# Patient Record
Sex: MALE | Race: WHITE | NOT HISPANIC OR LATINO | Employment: OTHER | URBAN - METROPOLITAN AREA
[De-identification: names, ages, dates, MRNs, and addresses within clinical notes are randomized per-mention and may not be internally consistent; named-entity substitution may affect disease eponyms.]

---

## 2020-11-18 ENCOUNTER — HISTORICAL (OUTPATIENT)
Dept: ADMINISTRATIVE | Facility: HOSPITAL | Age: 75
End: 2020-11-18

## 2021-01-27 ENCOUNTER — HISTORICAL (OUTPATIENT)
Dept: ADMINISTRATIVE | Facility: HOSPITAL | Age: 76
End: 2021-01-27

## 2022-01-31 ENCOUNTER — HISTORICAL (OUTPATIENT)
Dept: ADMINISTRATIVE | Facility: HOSPITAL | Age: 77
End: 2022-01-31

## 2022-01-31 LAB
BUN SERPL-MCNC: 16.9 MG/DL (ref 8.4–25.7)
CALCIUM SERPL-MCNC: 9.5 MG/DL (ref 8.7–10.5)
CHLORIDE SERPL-SCNC: 107 MMOL/L (ref 98–107)
CO2 SERPL-SCNC: 27 MMOL/L (ref 23–31)
CREAT SERPL-MCNC: 1.34 MG/DL (ref 0.73–1.18)
CREAT/UREA NIT SERPL: 13
DEPRECATED CALCIDIOL+CALCIFEROL SERPL-MC: 57.3 NG/ML (ref 30–80)
GLUCOSE SERPL-MCNC: 132 MG/DL (ref 82–115)
HEMOLYSIS INTERF INDEX SERPL-ACNC: 2
ICTERIC INTERF INDEX SERPL-ACNC: 0
LIPEMIC INTERF INDEX SERPL-ACNC: 4
POTASSIUM SERPL-SCNC: 4.5 MMOL/L (ref 3.5–5.1)
SODIUM SERPL-SCNC: 144 MMOL/L (ref 136–145)

## 2022-02-07 ENCOUNTER — HISTORICAL (OUTPATIENT)
Dept: ADMINISTRATIVE | Facility: HOSPITAL | Age: 77
End: 2022-02-07

## 2022-02-07 LAB
BUN SERPL-MCNC: 20.6 MG/DL (ref 8.4–25.7)
CALCIUM SERPL-MCNC: 10.1 MG/DL (ref 8.8–10)
CHLORIDE SERPL-SCNC: 106 MMOL/L (ref 98–107)
CO2 SERPL-SCNC: 26 MMOL/L (ref 23–31)
CREAT SERPL-MCNC: 1.42 MG/DL (ref 0.72–1.25)
CREAT/UREA NIT SERPL: 15
EST. AVERAGE GLUCOSE BLD GHB EST-MCNC: 114 MG/DL
GLUCOSE SERPL-MCNC: 108 MG/DL (ref 82–115)
HBA1C MFR BLD: 5.6 %
HEMOLYSIS INTERF INDEX SERPL-ACNC: 11
ICTERIC INTERF INDEX SERPL-ACNC: 1
POTASSIUM SERPL-SCNC: 4.8 MMOL/L (ref 3.5–5.1)
SODIUM SERPL-SCNC: 143 MMOL/L (ref 136–145)

## 2022-04-12 ENCOUNTER — HISTORICAL (OUTPATIENT)
Dept: ADMINISTRATIVE | Facility: HOSPITAL | Age: 77
End: 2022-04-12

## 2022-04-12 LAB
ABS NEUT (OLG): 5.54 (ref 2.1–9.2)
ALBUMIN SERPL-MCNC: 4.1 G/DL (ref 3.4–4.8)
ALBUMIN/GLOB SERPL: 1.2 {RATIO} (ref 1.1–2)
ALP SERPL-CCNC: 123 U/L (ref 40–150)
ALT SERPL-CCNC: 19 U/L (ref 0–55)
AST SERPL-CCNC: 18 U/L (ref 5–34)
BASOPHILS # BLD AUTO: 0.1 10*3/UL (ref 0–0.2)
BASOPHILS NFR BLD AUTO: 1 %
BILIRUB SERPL-MCNC: 0.8 MG/DL
BILIRUBIN DIRECT+TOT PNL SERPL-MCNC: 0.3 (ref 0–0.5)
BILIRUBIN DIRECT+TOT PNL SERPL-MCNC: 0.5 (ref 0–0.8)
BUN SERPL-MCNC: 15.7 MG/DL (ref 8.4–25.7)
CALCIUM SERPL-MCNC: 9.8 MG/DL (ref 8.7–10.5)
CHLORIDE SERPL-SCNC: 105 MMOL/L (ref 98–107)
CO2 SERPL-SCNC: 26 MMOL/L (ref 23–31)
CREAT SERPL-MCNC: 1.34 MG/DL (ref 0.73–1.18)
CREAT UR-MCNC: 76.3 MG/DL (ref 58–161)
EOSINOPHIL # BLD AUTO: 0.4 10*3/UL (ref 0–0.9)
EOSINOPHIL NFR BLD AUTO: 4 %
ERYTHROCYTE [DISTWIDTH] IN BLOOD BY AUTOMATED COUNT: 13.4 % (ref 11.5–17)
GLOBULIN SER-MCNC: 3.4 G/DL (ref 2.4–3.5)
GLUCOSE SERPL-MCNC: 85 MG/DL (ref 82–115)
HCT VFR BLD AUTO: 49.5 % (ref 42–52)
HEMOLYSIS INTERF INDEX SERPL-ACNC: 1
HGB BLD-MCNC: 16.3 G/DL (ref 14–18)
ICTERIC INTERF INDEX SERPL-ACNC: 1
LIPEMIC INTERF INDEX SERPL-ACNC: 1
LYMPHOCYTES # BLD AUTO: 3.3 10*3/UL (ref 0.6–4.6)
LYMPHOCYTES NFR BLD AUTO: 33 %
MANUAL DIFF? (OHS): NO
MCH RBC QN AUTO: 31 PG (ref 27–31)
MCHC RBC AUTO-ENTMCNC: 32.9 G/DL (ref 33–36)
MCV RBC AUTO: 94.1 FL (ref 80–94)
MONOCYTES # BLD AUTO: 0.6 10*3/UL (ref 0.1–1.3)
MONOCYTES NFR BLD AUTO: 6 %
NEUTROPHILS # BLD AUTO: 5.54 10*3/UL (ref 2.1–9.2)
NEUTROPHILS NFR BLD AUTO: 56 %
PHOSPHATE SERPL-MCNC: 3.3 MG/DL (ref 2.3–4.7)
PLATELET # BLD AUTO: 234 10*3/UL (ref 130–400)
PMV BLD AUTO: 10.3 FL (ref 9.4–12.4)
POTASSIUM SERPL-SCNC: 4.5 MMOL/L (ref 3.5–5.1)
PROT SERPL-MCNC: 7.5 G/DL (ref 5.8–7.6)
PROT UR STRIP-MCNC: <6.8 MG/DL
PTH-INTACT SERPL-MCNC: 81.3 PG/ML (ref 8.7–77.1)
RBC # BLD AUTO: 5.26 10*6/UL (ref 4.7–6.1)
SODIUM SERPL-SCNC: 140 MMOL/L (ref 136–145)
WBC # SPEC AUTO: 10 10*3/UL (ref 4.5–11.5)

## 2022-04-13 LAB
APPEARANCE, UA: CLEAR
BACTERIA SPEC CULT: NORMAL
BILIRUB UR QL STRIP: NEGATIVE
COLOR UR: YELLOW
GLUCOSE (UA): NEGATIVE
HGB UR QL STRIP: NEGATIVE
KETONES UR QL STRIP: NEGATIVE
LEUKOCYTE ESTERASE UR QL STRIP: NEGATIVE
NITRITE UR QL STRIP: NEGATIVE
PH UR STRIP: 5.5 [PH] (ref 5–9)
PROT UR QL STRIP: NEGATIVE
RBC #/AREA URNS HPF: NORMAL /[HPF] (ref 0–2)
SP GR UR STRIP: 1.01 (ref 1–1.03)
SQUAMOUS EPITHELIAL, UA: NORMAL (ref 0–4)
UA WBC MAN: NORMAL (ref 0–2)
UROBILINOGEN UR STRIP-ACNC: 0.2
WBC #/AREA URNS HPF: NORMAL /[HPF] (ref 0–2)

## 2022-04-21 DIAGNOSIS — N18.30 STAGE 3 CHRONIC KIDNEY DISEASE, UNSPECIFIED WHETHER STAGE 3A OR 3B CKD: Primary | ICD-10-CM

## 2022-04-21 DIAGNOSIS — I10 HYPERTENSION, UNSPECIFIED TYPE: ICD-10-CM

## 2022-04-21 DIAGNOSIS — D75.1 ERYTHROCYTOSIS: ICD-10-CM

## 2022-05-19 ENCOUNTER — OFFICE VISIT (OUTPATIENT)
Dept: HEMATOLOGY/ONCOLOGY | Facility: CLINIC | Age: 77
End: 2022-05-19
Payer: MEDICARE

## 2022-05-19 VITALS
DIASTOLIC BLOOD PRESSURE: 81 MMHG | HEIGHT: 68 IN | RESPIRATION RATE: 14 BRPM | TEMPERATURE: 98 F | SYSTOLIC BLOOD PRESSURE: 127 MMHG | WEIGHT: 164 LBS | BODY MASS INDEX: 24.86 KG/M2 | OXYGEN SATURATION: 98 % | HEART RATE: 71 BPM

## 2022-05-19 DIAGNOSIS — D45 POLYCYTHEMIA VERA: ICD-10-CM

## 2022-05-19 DIAGNOSIS — D75.1 POLYCYTHEMIA: ICD-10-CM

## 2022-05-19 DIAGNOSIS — N18.30 STAGE 3 CHRONIC KIDNEY DISEASE, UNSPECIFIED WHETHER STAGE 3A OR 3B CKD: Primary | ICD-10-CM

## 2022-05-19 LAB
BASOPHILS # BLD AUTO: 0.04 X10(3)/MCL (ref 0–0.2)
BASOPHILS NFR BLD AUTO: 0.4 %
EOSINOPHIL # BLD AUTO: 0.4 X10(3)/MCL (ref 0–0.9)
EOSINOPHIL NFR BLD AUTO: 3.6 %
ERYTHROCYTE [DISTWIDTH] IN BLOOD BY AUTOMATED COUNT: 13 % (ref 11.5–17)
HCT VFR BLD AUTO: 44.7 % (ref 42–52)
HGB BLD-MCNC: 14.7 GM/DL (ref 14–18)
IMM GRANULOCYTES # BLD AUTO: 0.03 X10(3)/MCL (ref 0–0.02)
IMM GRANULOCYTES NFR BLD AUTO: 0.3 % (ref 0–0.43)
LYMPHOCYTES # BLD AUTO: 3.61 X10(3)/MCL (ref 0.6–4.6)
LYMPHOCYTES NFR BLD AUTO: 32.6 %
MCH RBC QN AUTO: 31.2 PG (ref 27–31)
MCHC RBC AUTO-ENTMCNC: 32.9 MG/DL (ref 33–36)
MCV RBC AUTO: 94.9 FL (ref 80–94)
MONOCYTES # BLD AUTO: 0.85 X10(3)/MCL (ref 0.1–1.3)
MONOCYTES NFR BLD AUTO: 7.7 %
NEUTROPHILS # BLD AUTO: 6.1 X10(3)/MCL (ref 2.1–9.2)
NEUTROPHILS NFR BLD AUTO: 55.4 %
PLATELET # BLD AUTO: 223 X10(3)/MCL (ref 130–400)
PMV BLD AUTO: 8.9 FL (ref 9.4–12.4)
RBC # BLD AUTO: 4.71 X10(6)/MCL (ref 4.7–6.1)
WBC # SPEC AUTO: 11.1 X10(3)/MCL (ref 4.5–11.5)

## 2022-05-19 PROCEDURE — 99999 PR PBB SHADOW E&M-EST. PATIENT-LVL IV: ICD-10-PCS | Mod: PBBFAC,,, | Performed by: INTERNAL MEDICINE

## 2022-05-19 PROCEDURE — 99204 OFFICE O/P NEW MOD 45 MIN: CPT | Mod: S$PBB,,, | Performed by: INTERNAL MEDICINE

## 2022-05-19 PROCEDURE — 99204 PR OFFICE/OUTPT VISIT, NEW, LEVL IV, 45-59 MIN: ICD-10-PCS | Mod: S$PBB,,, | Performed by: INTERNAL MEDICINE

## 2022-05-19 PROCEDURE — 36415 COLL VENOUS BLD VENIPUNCTURE: CPT | Performed by: INTERNAL MEDICINE

## 2022-05-19 PROCEDURE — 82668 ASSAY OF ERYTHROPOIETIN: CPT | Performed by: INTERNAL MEDICINE

## 2022-05-19 PROCEDURE — 99214 OFFICE O/P EST MOD 30 MIN: CPT | Mod: PBBFAC | Performed by: INTERNAL MEDICINE

## 2022-05-19 PROCEDURE — 99999 PR PBB SHADOW E&M-EST. PATIENT-LVL IV: CPT | Mod: PBBFAC,,, | Performed by: INTERNAL MEDICINE

## 2022-05-19 PROCEDURE — 85025 COMPLETE CBC W/AUTO DIFF WBC: CPT | Performed by: INTERNAL MEDICINE

## 2022-05-19 RX ORDER — TAMSULOSIN HYDROCHLORIDE 0.4 MG/1
1 CAPSULE ORAL DAILY
COMMUNITY
Start: 2022-02-05

## 2022-05-19 RX ORDER — CETIRIZINE HYDROCHLORIDE 10 MG/1
10 TABLET ORAL DAILY
COMMUNITY

## 2022-05-19 RX ORDER — FAMOTIDINE 40 MG/1
40 TABLET, FILM COATED ORAL
COMMUNITY

## 2022-05-19 RX ORDER — ATORVASTATIN CALCIUM 20 MG/1
20 TABLET, FILM COATED ORAL DAILY
COMMUNITY
Start: 2022-03-21

## 2022-05-19 RX ORDER — ALBUTEROL SULFATE 90 UG/1
2 AEROSOL, METERED RESPIRATORY (INHALATION) DAILY
COMMUNITY
Start: 2022-02-04 | End: 2023-09-29 | Stop reason: SDUPTHER

## 2022-05-19 RX ORDER — TIOTROPIUM BROMIDE AND OLODATEROL 3.124; 2.736 UG/1; UG/1
2 SPRAY, METERED RESPIRATORY (INHALATION) DAILY
COMMUNITY
Start: 2022-04-04 | End: 2023-04-04 | Stop reason: SDUPTHER

## 2022-05-19 RX ORDER — VITAMIN B COMPLEX
1 CAPSULE ORAL DAILY
COMMUNITY

## 2022-05-19 RX ORDER — AMOXICILLIN 500 MG
2 CAPSULE ORAL DAILY
COMMUNITY

## 2022-05-19 RX ORDER — IBUPROFEN 100 MG/5ML
1000 SUSPENSION, ORAL (FINAL DOSE FORM) ORAL DAILY
COMMUNITY

## 2022-05-19 RX ORDER — FLUTICASONE PROPIONATE 50 MCG
1 SPRAY, SUSPENSION (ML) NASAL DAILY
COMMUNITY

## 2022-05-19 RX ORDER — ASPIRIN 81 MG/1
81 TABLET ORAL DAILY
COMMUNITY

## 2022-05-19 RX ORDER — ZINC GLUCONATE 50 MG
50 TABLET ORAL DAILY
COMMUNITY

## 2022-05-19 RX ORDER — FINASTERIDE 5 MG/1
5 TABLET, FILM COATED ORAL DAILY
COMMUNITY
Start: 2022-04-17

## 2022-05-19 NOTE — PROGRESS NOTES
Subjective:      Nephrologist: Dr. Brooklyn Weinstein     Patient ID: Edwin Raman is a 77 y.o. male.    Erythrocytosis/Polycythemia--Since 2020      Chief Complaint: Other Misc (NPH)    HPI     78 yo wm with h/o CKD Stage 3 secondary to nephrosclerosis found on routine labs by nephrologist to have mild erythrocytosis. Patient has a history of COPD on home O2 and also reports having a sleep study and O2 monitoring at night and his O2 was low at night. But he has not been diagnosed with sleep apnea and does not wear a CPAP. He is a former heavy smoker but quit several years ago.     Past Medical History:   Diagnosis Date    Arthritis     Bladder cancer     Kidney cancer, primary, with metastasis from kidney to other site 2018      Review of patient's allergies indicates:   Allergen Reactions    Ciprofloxacin Other (See Comments)      Current Outpatient Medications on File Prior to Visit   Medication Sig Dispense Refill    albuterol (PROVENTIL/VENTOLIN HFA) 90 mcg/actuation inhaler Inhale 2 puffs into the lungs once daily at 6am.      ascorbic acid, vitamin C, (VITAMIN C) 1000 MG tablet Take 1,000 mg by mouth once daily.      aspirin (ECOTRIN) 81 MG EC tablet Take 81 mg by mouth once daily.      atorvastatin (LIPITOR) 20 MG tablet Take 20 mg by mouth once daily.      b complex vitamins capsule Take 1 capsule by mouth once daily.      cetirizine (ZYRTEC) 10 MG tablet Take 10 mg by mouth once daily.      famotidine (PEPCID) 40 MG tablet Take 40 mg by mouth as needed for Heartburn.      finasteride (PROSCAR) 5 mg tablet Take 5 mg by mouth once daily.      fluticasone propionate (FLONASE) 50 mcg/actuation nasal spray 1 spray by Each Nostril route once daily.      multivitamin with minerals tablet Take 1 tablet by mouth once daily.      omega-3 fatty acids/fish oil (FISH OIL-OMEGA-3 FATTY ACIDS) 300-1,000 mg capsule Take 2 capsules by mouth once daily.      STIOLTO RESPIMAT 2.5-2.5 mcg/actuation Mist Inhale 2  puffs into the lungs once daily at 6am.      tamsulosin (FLOMAX) 0.4 mg Cap Take 1 capsule by mouth once daily.      zinc gluconate 50 mg tablet Take 50 mg by mouth once daily.       No current facility-administered medications on file prior to visit.      Review of Systems   Constitutional: Negative for appetite change, fatigue, fever and unexpected weight change.   HENT: Negative for mouth sores.    Eyes: Negative.    Respiratory: Positive for shortness of breath. Negative for cough.         COPD on home O2   Cardiovascular: Negative for chest pain and leg swelling.   Gastrointestinal: Negative for abdominal distention, abdominal pain, constipation, diarrhea, nausea, vomiting and reflux.   Genitourinary: Negative for difficulty urinating, dysuria and hematuria.   Musculoskeletal: Negative for arthralgias and back pain.   Integumentary:  Negative for rash.   Neurological: Negative for weakness and headaches.   Hematological: Negative for adenopathy.   Psychiatric/Behavioral: Negative for sleep disturbance. The patient is not nervous/anxious.               Physical Exam  Constitutional:       General: He is awake.      Appearance: Normal appearance.   HENT:      Head: Normocephalic and atraumatic.      Nose: Nose normal.      Mouth/Throat:      Mouth: Mucous membranes are moist.   Eyes:      General: Vision grossly intact.      Extraocular Movements: Extraocular movements intact.      Conjunctiva/sclera: Conjunctivae normal.   Cardiovascular:      Rate and Rhythm: Normal rate and regular rhythm.      Heart sounds: Normal heart sounds.   Pulmonary:      Effort: Pulmonary effort is normal.      Breath sounds: Normal breath sounds.   Chest:   Breasts:      Right: No supraclavicular adenopathy.      Left: No supraclavicular adenopathy.       Abdominal:      General: Bowel sounds are normal. There is no distension.      Palpations: Abdomen is soft.      Tenderness: There is no abdominal tenderness.   Musculoskeletal:       Cervical back: Normal range of motion and neck supple.      Right lower leg: No edema.      Left lower leg: No edema.   Lymphadenopathy:      Cervical: No cervical adenopathy.      Upper Body:      Right upper body: No supraclavicular adenopathy.      Left upper body: No supraclavicular adenopathy.   Skin:     General: Skin is warm.   Neurological:      Mental Status: He is alert and oriented to person, place, and time.      Motor: Motor function is intact.   Psychiatric:         Mood and Affect: Mood normal.         Speech: Speech normal.         Behavior: Behavior is cooperative.         Judgment: Judgment normal.         No visits with results within 2 Week(s) from this visit.   Latest known visit with results is:   Historical on 04/12/2022   Component Date Value    WBC 04/12/2022 10.0     RBC 04/12/2022 5.26     Hgb 04/12/2022 16.3     Hct 04/12/2022 49.5     MCV 04/12/2022 94.1     MCH 04/12/2022 31.0     MCHC 04/12/2022 32.9     RDW 04/12/2022 13.4     Platelet 04/12/2022 234     MPV 04/12/2022 10.3     Abs Neut 04/12/2022 5.54     Manual Diff? 04/12/2022 No     Neut % 04/12/2022 56     Lymph % 04/12/2022 33     Mono % 04/12/2022 6     Eos % 04/12/2022 4     Basophil % 04/12/2022 1     Lymph # 04/12/2022 3.3     Neut # 04/12/2022 5.54     Mono # 04/12/2022 0.6     Eos # 04/12/2022 0.4     Baso # 04/12/2022 0.1     Sodium Level 04/12/2022 140     Potassium Level 04/12/2022 4.5     Chloride 04/12/2022 105     Carbon Dioxide 04/12/2022 26     Glucose Level 04/12/2022 85     Blood Urea Nitrogen 04/12/2022 15.7     Creatinine 04/12/2022 1.34     Calcium Level Total 04/12/2022 9.8     Albumin Level 04/12/2022 4.1     Protein Total 04/12/2022 7.5     Globulin 04/12/2022 3.4     Albumin/Globulin Ratio 04/12/2022 1.2     Alkaline Phosphatase 04/12/2022 123     Bilirubin Total 04/12/2022 0.8     Bilirubin Direct 04/12/2022 0.3     Bilirubin Indirect 04/12/2022 0.50      Aspartate Aminotransfera* 04/12/2022 18     Alanine Aminotransferase 04/12/2022 19     Hemolysis 04/12/2022 1     Icterus 04/12/2022 1     Lipemia 04/12/2022 1     Phosphorus Level 04/12/2022 3.3     Estimated GFR- Am* 04/12/2022 >60     Estimated GFR-Non Jena* 04/12/2022 55     Parathyroid Hormone Inta* 04/12/2022 81.3     Urine Creatinine 04/12/2022 76.3     Urine Protein Level 04/12/2022 <6.8     Color, UA 04/12/2022 Yellow     Appearance, UA 04/12/2022 Clear     Specific Gravity,UA 04/12/2022 1.015     pH, UA 04/12/2022 5.5     Protein, UA 04/12/2022 Negative     Glucose, UA 04/12/2022 Negative     Ketones, UA 04/12/2022 Negative     Bilirubin (UA) 04/12/2022 Negative     Occult Blood UA 04/12/2022 Negative     Urobilinogen, UA 04/12/2022 0.2     Nitrite, UA 04/12/2022 Negative     Leukocytes, UA 04/12/2022 Negative     WBC, UA 04/12/2022 None Seen     UA WBC MAN 04/12/2022 None Seen     RBC, UA 04/12/2022 None Seen     Squam Epithel, UA 04/12/2022 0-4     Bacteria 04/12/2022 None Seen             Assessment:       1. Polycythemia    2. Polycythemia vera          Plan:       Patient with polycythemia despite having CKD which usually causes anemia.  Patient also with a h/o COPD on home O2 PRN.  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 Vera.  Will check Epo level and JAK2.  RTC 3 weeks for follow-up of results.    If findings c/w secondary polycythemia, no further w/u needed due to likely cause being COPD.  If JAK2 positive, will need treatment with phlebotomy and Hydrea due to age and risks.       All questions answered at this time.          Theresa Guardado MD

## 2022-05-23 LAB
EPO SERPL-ACNC: 18.2 MIU/ML (ref 2.6–18.5)
JAK2 P.V617F BLD/T QL: NORMAL
JAK2 P.V617F MUT/NOR BLD/T: NORMAL %

## 2022-06-09 ENCOUNTER — OFFICE VISIT (OUTPATIENT)
Dept: HEMATOLOGY/ONCOLOGY | Facility: CLINIC | Age: 77
End: 2022-06-09
Payer: MEDICARE

## 2022-06-09 VITALS
OXYGEN SATURATION: 95 % | HEART RATE: 78 BPM | RESPIRATION RATE: 14 BRPM | WEIGHT: 164.44 LBS | TEMPERATURE: 98 F | DIASTOLIC BLOOD PRESSURE: 71 MMHG | HEIGHT: 68 IN | SYSTOLIC BLOOD PRESSURE: 114 MMHG | BODY MASS INDEX: 24.92 KG/M2

## 2022-06-09 DIAGNOSIS — D75.1 POLYCYTHEMIA: Primary | ICD-10-CM

## 2022-06-09 PROCEDURE — 99999 PR PBB SHADOW E&M-EST. PATIENT-LVL IV: CPT | Mod: PBBFAC,,, | Performed by: INTERNAL MEDICINE

## 2022-06-09 PROCEDURE — 99214 PR OFFICE/OUTPT VISIT, EST, LEVL IV, 30-39 MIN: ICD-10-PCS | Mod: S$PBB,,, | Performed by: INTERNAL MEDICINE

## 2022-06-09 PROCEDURE — 99214 OFFICE O/P EST MOD 30 MIN: CPT | Mod: S$PBB,,, | Performed by: INTERNAL MEDICINE

## 2022-06-09 PROCEDURE — 99999 PR PBB SHADOW E&M-EST. PATIENT-LVL IV: ICD-10-PCS | Mod: PBBFAC,,, | Performed by: INTERNAL MEDICINE

## 2022-06-09 PROCEDURE — 99214 OFFICE O/P EST MOD 30 MIN: CPT | Mod: PBBFAC | Performed by: INTERNAL MEDICINE

## 2022-06-28 ENCOUNTER — OFFICE VISIT (OUTPATIENT)
Dept: FAMILY MEDICINE | Facility: CLINIC | Age: 77
End: 2022-06-28
Payer: MEDICARE

## 2022-06-28 VITALS
RESPIRATION RATE: 20 BRPM | BODY MASS INDEX: 24.19 KG/M2 | WEIGHT: 159.63 LBS | TEMPERATURE: 98 F | HEIGHT: 68 IN | SYSTOLIC BLOOD PRESSURE: 132 MMHG | HEART RATE: 60 BPM | DIASTOLIC BLOOD PRESSURE: 79 MMHG | OXYGEN SATURATION: 97 %

## 2022-06-28 DIAGNOSIS — N18.30 STAGE 3 CHRONIC KIDNEY DISEASE, UNSPECIFIED WHETHER STAGE 3A OR 3B CKD: ICD-10-CM

## 2022-06-28 DIAGNOSIS — J30.9 ALLERGIC RHINITIS, UNSPECIFIED SEASONALITY, UNSPECIFIED TRIGGER: ICD-10-CM

## 2022-06-28 DIAGNOSIS — R91.1 PULMONARY NODULE, RIGHT: ICD-10-CM

## 2022-06-28 DIAGNOSIS — J44.9 CHRONIC OBSTRUCTIVE PULMONARY DISEASE, UNSPECIFIED COPD TYPE: ICD-10-CM

## 2022-06-28 DIAGNOSIS — I25.10 CORONARY ARTERY DISEASE, UNSPECIFIED VESSEL OR LESION TYPE, UNSPECIFIED WHETHER ANGINA PRESENT, UNSPECIFIED WHETHER NATIVE OR TRANSPLANTED HEART: ICD-10-CM

## 2022-06-28 DIAGNOSIS — Z85.51 HISTORY OF BLADDER CANCER: ICD-10-CM

## 2022-06-28 DIAGNOSIS — Z00.00 MEDICARE ANNUAL WELLNESS VISIT, SUBSEQUENT: Primary | ICD-10-CM

## 2022-06-28 DIAGNOSIS — N40.0 BENIGN PROSTATIC HYPERPLASIA, UNSPECIFIED WHETHER LOWER URINARY TRACT SYMPTOMS PRESENT: ICD-10-CM

## 2022-06-28 DIAGNOSIS — K21.9 GASTROESOPHAGEAL REFLUX DISEASE, UNSPECIFIED WHETHER ESOPHAGITIS PRESENT: ICD-10-CM

## 2022-06-28 PROBLEM — D75.1 POLYCYTHEMIA: Status: RESOLVED | Noted: 2022-05-19 | Resolved: 2022-06-28

## 2022-06-28 PROCEDURE — G0439 PPPS, SUBSEQ VISIT: HCPCS | Mod: ,,, | Performed by: STUDENT IN AN ORGANIZED HEALTH CARE EDUCATION/TRAINING PROGRAM

## 2022-06-28 PROCEDURE — G0439 PR MEDICARE ANNUAL WELLNESS SUBSEQUENT VISIT: ICD-10-PCS | Mod: ,,, | Performed by: STUDENT IN AN ORGANIZED HEALTH CARE EDUCATION/TRAINING PROGRAM

## 2022-06-28 NOTE — ASSESSMENT & PLAN NOTE
- Patient following with Dr. Parvez Leigh with Pulmonology.  - He had COVID in 11/2020 and stopped smoking since that time.  - He states compliance with his Stiolto Respimat daily.

## 2022-06-28 NOTE — PROGRESS NOTES
"TIME UP & GO (TUG)  Test begins with patient sitting back in standard arm chair.   When "Go" is said, the patient stands up and walks 10 feet at a normal pace before turning, walking back and sitting down.    Observe the patients postural stability, gait, stride length, and sway.  Check all that apply:  ? [] Slow tentative pace  ? [] Loss of balance  ? [] Short strides  ? [] Little or no arm swing  ? [] Steadying self on walls  ? [] Shuffling  ? [] En bloc turning  ? [] Not using assistive device properly    Time in seconds:  7 Seconds  (Older adults who takes = or > 12 seconds to complete TUG is at risk for falling.      WHISPER TEST  Test begins with patient standing arms length away (2 feet), facing away from examiner.  Patient covers the ear that is NOT being tested with one finger over the tragus.  Whisper a number-letter-number combination.  If a patient gets 3 total letters and/or numbers correct after a second attempt, it is considered a pass.    Right Ear: passed    [] 8-M-3   [] K-5-R   [] 2-K-7   [] S-4-G  Left Ear: passed       [] 8-M-3   [] K-5-R   [] 2-K-7   [] S-4-G      MINI-COGNITIVE  Three Word Registration   [x]Version 1 [x]Version 2 [x]Version 3 [x]Version 4 [x]Version 5 [x]Version 6   [facility] Captain Daughter   [place] [employer]   Chair Table Baby Finger Picture Moutain           HOME SAFETY QUESTIONNAIRE  Are emergency numbers kept by the phone and regularly updated? Yes  Are all household members aware of the dangers of smoking, especially in bed? Yes  Are working smoke alarm(s) and fire extinguisher(s) available for use? Yes  Do all household members know how to use them? Yes  Are firearms stored unloaded and securely locked? N/A  Have throw rugs been removed or fastened down? N/A  Are non-slip mats in all bathtubs and showers?  Yes  Do all stairways have a railing or banister?  Yes  Are sidewalks and all outdoor steps clear of tools, toys and " other articles?  Yes  Are doorways, halls, and stairs free of clutter?  Yes  Are all electrical cords in working order, easily seen, and not run under rug/carpets or wrapped around nails? Yes

## 2022-06-28 NOTE — ASSESSMENT & PLAN NOTE
- Patient following with Dr. Brooklyn Coreas with Nephrology.   - Nephrology office visit from 04/21/22 reviewed.

## 2022-06-28 NOTE — ASSESSMENT & PLAN NOTE
- Patient following with Dr. Nas Simon with Cardiology.  - Patient taking Aspirin and Lipitor.  - Patient not able to take beta blocker due to low HR.

## 2022-06-28 NOTE — PROGRESS NOTES
"Subjective:      Patient ID: Edwin Raman is a 77 y.o. male. He is accompanied by his wife, Mrs. Dana Raman.    Chief Complaint: Medicare Wellness    Preventative Health: Patient amenable to advance care planning discussion.    COPD/Hx of Right Pulmonary Nodule: Patient following with Dr. Parvez Leigh with Pulmonology. He had COVID in 11/2020 and stopped smoking since that time. He states compliance with his Stiolto Respimat daily. He denies any shortness of breath or productive cough.     CAD s/p CABG x2: Patient following with Dr. Nas Simon with Cardiology. Patient taking Aspirin and Lipitor. Patient not able to take beta blocker due to low HR. He denies any chest pain.    BPH/Hx of Bladder Cancer: Patient following with Dr. Demarco Munoz with Urology. Patient had metastases to the kidney and treated with surgery and chemo in 2012. He is taking Finasteride and Flomax. He denies any hematuria.    GERD: Patient taking Pepcid PRN.    Polycythemia: Patient following with Dr. Theresa Guardado with Hematology-Oncology.    CKD III: Patient following with Dr. Brooklyn Coreas with Nephrology.     Review of Systems   Constitutional: Negative for activity change and fatigue.   Respiratory: Negative for apnea, cough and shortness of breath.    Cardiovascular: Negative for chest pain, palpitations and leg swelling.   Gastrointestinal: Negative for abdominal pain, nausea and vomiting.   Musculoskeletal: Negative for arthralgias.   Skin: Negative for rash and wound.   Neurological: Negative for weakness, numbness and headaches.   Psychiatric/Behavioral: Negative for behavioral problems.     Objective:   /79 (BP Location: Left arm)   Pulse 60   Temp 98.2 °F (36.8 °C)   Resp 20   Ht 5' 8" (1.727 m)   Wt 72.4 kg (159 lb 9.6 oz)   SpO2 97%   BMI 24.27 kg/m²     Physical Exam  Vitals and nursing note reviewed.   Constitutional:       General: He is not in acute distress.     Appearance: Normal appearance. He is " not ill-appearing or toxic-appearing.   HENT:      Head: Normocephalic and atraumatic.      Mouth/Throat:      Mouth: Mucous membranes are moist.      Pharynx: Oropharynx is clear.   Eyes:      Conjunctiva/sclera: Conjunctivae normal.   Cardiovascular:      Rate and Rhythm: Normal rate and regular rhythm.      Heart sounds: Normal heart sounds. No murmur heard.  Pulmonary:      Effort: Pulmonary effort is normal. No respiratory distress.      Breath sounds: Normal breath sounds. No wheezing.   Abdominal:      General: Bowel sounds are normal. There is no distension.      Palpations: Abdomen is soft.      Tenderness: There is no abdominal tenderness.   Musculoskeletal:         General: No deformity. Normal range of motion.      Cervical back: Normal range of motion and neck supple.      Right lower leg: No edema.      Left lower leg: No edema.   Skin:     General: Skin is warm and dry.      Findings: No lesion or rash.   Neurological:      General: No focal deficit present.      Mental Status: He is alert. Mental status is at baseline.   Psychiatric:         Mood and Affect: Mood normal.         Behavior: Behavior normal.         Thought Content: Thought content normal.         Judgment: Judgment normal.       Assessment:     1. Medicare annual wellness visit, subsequent    2. Chronic obstructive pulmonary disease, unspecified COPD type    3. Pulmonary nodule, right    4. Coronary artery disease, unspecified vessel or lesion type, unspecified whether angina present, unspecified whether native or transplanted heart    5. Benign prostatic hyperplasia, unspecified whether lower urinary tract symptoms present    6. History of bladder cancer    7. Gastroesophageal reflux disease, unspecified whether esophagitis present    8. Stage 3 chronic kidney disease, unspecified whether stage 3a or 3b CKD    9. Allergic rhinitis, unspecified seasonality, unspecified trigger      Plan:     Problem List Items Addressed This Visit         ENT    Allergic rhinitis     - Continue Flonase and Zyrtec.              Pulmonary    COPD (chronic obstructive pulmonary disease)     - Patient following with Dr. Parvez Leigh with Pulmonology.  - He had COVID in 11/2020 and stopped smoking since that time.  - He states compliance with his Stiolto Respimat daily.             Pulmonary nodule, right     - CT scans/surveillance per Pulmonology.                Cardiac/Vascular    CAD (coronary artery disease)     - Patient following with Dr. Nas Simon with Cardiology.  - Patient taking Aspirin and Lipitor.  - Patient not able to take beta blocker due to low HR.                Renal/    BPH (benign prostatic hyperplasia)     - Patient following with Dr. eDmarco Munoz with Urology.  - He is taking Finasteride and Flomax.             CKD (chronic kidney disease), stage III     - Patient following with Dr. Brooklyn Coreas with Nephrology.   - Nephrology office visit from 04/21/22 reviewed.              Oncology    History of bladder cancer     - Patient had metastases to the kidney and treated with surgery and chemo in 2012.                 GI    GERD (gastroesophageal reflux disease)     - Continue Pepcid PRN.             Other Visit Diagnoses     Medicare annual wellness visit, subsequent    -  Primary         I attest that I have reviewed the flow sheeting and nursing notes which includes the TUG test, Whisper Screen, HRA, Mini-cognitive assessment, Fall and Depression Screen.    - Advanced Care Planning Discussion: I attest that I have had a face-to-face discussion with patient.  - Included Surrogate Decision Maker: No  - Advance Directive in Chart: No  - LAPOST: No  - Total Time Spent: 16 minutes

## 2022-06-28 NOTE — ASSESSMENT & PLAN NOTE
- Patient following with Dr. Demarco Munoz with Urology.  - He is taking Finasteride and Flomax.

## 2022-07-05 ENCOUNTER — PROCEDURE VISIT (OUTPATIENT)
Dept: RESPIRATORY THERAPY | Facility: HOSPITAL | Age: 77
End: 2022-07-05
Attending: STUDENT IN AN ORGANIZED HEALTH CARE EDUCATION/TRAINING PROGRAM
Payer: MEDICARE

## 2022-07-05 VITALS — HEART RATE: 93 BPM | OXYGEN SATURATION: 96 % | RESPIRATION RATE: 16 BRPM

## 2022-07-05 DIAGNOSIS — J44.9 COPD (CHRONIC OBSTRUCTIVE PULMONARY DISEASE): Primary | ICD-10-CM

## 2022-07-05 LAB
DLCO ADJ PRE: 7.44 ML/(MIN*MMHG) (ref 16.15–30.01)
DLCO SINGLE BREATH LLN: 16.15
DLCO SINGLE BREATH PRE REF: 32.2 %
DLCO SINGLE BREATH REF: 23.08
DLCOC SBVA LLN: 2.33
DLCOC SBVA PRE REF: 42 %
DLCOC SBVA REF: 3.59
DLCOC SINGLE BREATH LLN: 16.15
DLCOC SINGLE BREATH PRE REF: 32.2 %
DLCOC SINGLE BREATH REF: 23.08
DLCOCSBVAULN: 4.85
DLCOCSINGLEBREATHULN: 30.01
DLCOSINGLEBREATHULN: 30.01
DLCOVA LLN: 2.33
DLCOVA PRE REF: 42 %
DLCOVA PRE: 1.51 ML/(MIN*MMHG*L) (ref 2.33–4.85)
DLCOVA REF: 3.59
DLCOVAULN: 4.85
DLVAADJ PRE: 1.51 ML/(MIN*MMHG*L) (ref 2.33–4.85)
ERV LLN: -16449.11
ERV PRE REF: 141.3 %
ERV REF: 0.89
ERVULN: ABNORMAL
FEF 25 75 LLN: 0.8
FEF 25 75 PRE REF: 25.9 %
FEF 25 75 REF: 2
FET100 CHG: 14.7 %
FEV1 CHG: 3.7 %
FEV1 FVC LLN: 61
FEV1 FVC PRE REF: 59 %
FEV1 FVC REF: 76
FEV1 LLN: 1.89
FEV1 PRE REF: 70.6 %
FEV1 REF: 2.68
FEV1 VOL CHG: 0.07
FRCPLETH LLN: 2.56
FRCPLETH PREREF: 93.9 %
FRCPLETH REF: 3.55
FRCPLETHULN: 4.54
FVC CHG: 1.1 %
FVC LLN: 2.6
FVC PRE REF: 119 %
FVC REF: 3.56
FVC VOL CHG: 0.04
IVC PRE: 3.31 L (ref 2.6–4.52)
IVC SINGLE BREATH LLN: 2.6
IVC SINGLE BREATH PRE REF: 93 %
IVC SINGLE BREATH REF: 3.56
IVCSINGLEBREATHULN: 4.52
LLN IC: -9999997.36
MVV LLN: 87
MVV PRE REF: 75.8 %
MVV REF: 103
PEF LLN: 4.86
PEF PRE REF: 104.7 %
PEF REF: 6.95
POST FEF 25 75: 0.52 L/S (ref 0.8–3.19)
POST FET 100: 19.77 SEC
POST FEV1 FVC: 45.86 % (ref 61.39–90.1)
POST FEV1: 1.96 L (ref 1.89–3.47)
POST FVC: 4.28 L (ref 2.6–4.52)
POST PEF: 6.87 L/S (ref 4.86–9.05)
PRE DLCO: 7.44 ML/(MIN*MMHG) (ref 16.15–30.01)
PRE ERV: 1.26 L (ref -16449.11–16450.89)
PRE FEF 25 75: 0.52 L/S (ref 0.8–3.19)
PRE FET 100: 17.23 SEC
PRE FEV1 FVC: 44.7 % (ref 61.39–90.1)
PRE FEV1: 1.89 L (ref 1.89–3.47)
PRE FRC PL: 3.33 L
PRE FVC: 4.24 L (ref 2.6–4.52)
PRE IC: 2.98 L (ref -9999997.36–#######.####)
PRE MVV: 78 L/MIN (ref 87.48–118.36)
PRE PEF: 7.28 L/S (ref 4.86–9.05)
PRE REF IC: 112.8 %
PRE RV: 2.07 L (ref 1.98–3.33)
PRE TLC: 6.31 L (ref 5.27–7.57)
PRE VTG: 4.87 L
RAW PRE REF: 69.6 %
RAW PRE: 2.13 CMH2O*S/L (ref 3.06–3.06)
RAW REF: 3.06
REF IC: 2.64
RV LLN: 1.98
RV PRE REF: 77.9 %
RV REF: 2.66
RVTLC LLN: 35
RVTLC PRE REF: 75.3 %
RVTLC PRE: 32.82 % (ref 34.62–52.58)
RVTLC REF: 44
RVTLCULN: 53
RVULN: 3.33
SGAW PRE REF: 169.2 %
SGAW PRE: 0.14 1/(CMH2O*S) (ref 0.08–0.08)
SGAW REF: 0.08
TLC LLN: 5.27
TLC PRE REF: 98.2 %
TLC REF: 6.42
TLC ULN: 7.57
ULN IC: ABNORMAL
VA PRE: 4.93 L (ref 6.27–6.27)
VA SINGLE BREATH PRE REF: 78.7 %
VA SINGLE BREATH REF: 6.27
VC LLN: 2.6
VC PRE REF: 119.1 %
VC PRE: 4.24 L (ref 2.6–4.52)
VC REF: 3.56
VC ULN: 4.52

## 2022-07-05 PROCEDURE — 25000242 PHARM REV CODE 250 ALT 637 W/ HCPCS: Performed by: INTERNAL MEDICINE

## 2022-07-05 PROCEDURE — 94727 GAS DIL/WSHOT DETER LNG VOL: CPT

## 2022-07-05 PROCEDURE — 94060 EVALUATION OF WHEEZING: CPT

## 2022-07-05 PROCEDURE — 94729 DIFFUSING CAPACITY: CPT

## 2022-07-05 RX ORDER — ALBUTEROL SULFATE 0.83 MG/ML
2.5 SOLUTION RESPIRATORY (INHALATION)
Status: COMPLETED | OUTPATIENT
Start: 2022-07-05 | End: 2022-07-05

## 2022-07-05 RX ADMIN — ALBUTEROL SULFATE 2.5 MG: 2.5 SOLUTION RESPIRATORY (INHALATION) at 08:07

## 2022-09-29 ENCOUNTER — LAB VISIT (OUTPATIENT)
Dept: LAB | Facility: HOSPITAL | Age: 77
End: 2022-09-29
Attending: INTERNAL MEDICINE
Payer: MEDICARE

## 2022-09-29 DIAGNOSIS — N18.30 STAGE 3 CHRONIC KIDNEY DISEASE, UNSPECIFIED WHETHER STAGE 3A OR 3B CKD: ICD-10-CM

## 2022-09-29 DIAGNOSIS — I10 HYPERTENSION, UNSPECIFIED TYPE: ICD-10-CM

## 2022-09-29 DIAGNOSIS — D75.1 ERYTHROCYTOSIS: ICD-10-CM

## 2022-09-29 LAB
ALBUMIN SERPL-MCNC: 3.9 GM/DL (ref 3.4–4.8)
ALBUMIN/GLOB SERPL: 1.2 RATIO (ref 1.1–2)
ALP SERPL-CCNC: 129 UNIT/L (ref 40–150)
ALT SERPL-CCNC: 21 UNIT/L (ref 0–55)
APPEARANCE UR: CLEAR
AST SERPL-CCNC: 17 UNIT/L (ref 5–34)
BACTERIA #/AREA URNS AUTO: NORMAL /HPF
BASOPHILS # BLD AUTO: 0.07 X10(3)/MCL (ref 0–0.2)
BASOPHILS NFR BLD AUTO: 0.7 %
BILIRUB UR QL STRIP.AUTO: NEGATIVE MG/DL
BILIRUBIN DIRECT+TOT PNL SERPL-MCNC: 0.6 MG/DL
BUN SERPL-MCNC: 18.6 MG/DL (ref 8.4–25.7)
CALCIUM SERPL-MCNC: 9.6 MG/DL (ref 8.8–10)
CHLORIDE SERPL-SCNC: 106 MMOL/L (ref 98–107)
CO2 SERPL-SCNC: 26 MMOL/L (ref 23–31)
COLOR UR AUTO: ABNORMAL
CREAT SERPL-MCNC: 1.25 MG/DL (ref 0.73–1.18)
CREAT UR-MCNC: 174.5 MG/DL (ref 63–166)
EOSINOPHIL # BLD AUTO: 0.32 X10(3)/MCL (ref 0–0.9)
EOSINOPHIL NFR BLD AUTO: 3.1 %
ERYTHROCYTE [DISTWIDTH] IN BLOOD BY AUTOMATED COUNT: 13.3 % (ref 11.5–17)
GFR SERPLBLD CREATININE-BSD FMLA CKD-EPI: 60 MLS/MIN/1.73/M2
GLOBULIN SER-MCNC: 3.3 GM/DL (ref 2.4–3.5)
GLUCOSE SERPL-MCNC: 113 MG/DL (ref 82–115)
GLUCOSE UR QL STRIP.AUTO: NEGATIVE MG/DL
HCT VFR BLD AUTO: 44.2 % (ref 42–52)
HGB BLD-MCNC: 15.4 GM/DL (ref 14–18)
IMM GRANULOCYTES # BLD AUTO: 0.07 X10(3)/MCL (ref 0–0.04)
IMM GRANULOCYTES NFR BLD AUTO: 0.7 %
KETONES UR QL STRIP.AUTO: ABNORMAL MG/DL
LEUKOCYTE ESTERASE UR QL STRIP.AUTO: NEGATIVE UNIT/L
LYMPHOCYTES # BLD AUTO: 2.76 X10(3)/MCL (ref 0.6–4.6)
LYMPHOCYTES NFR BLD AUTO: 27.1 %
MCH RBC QN AUTO: 31.2 PG (ref 27–31)
MCHC RBC AUTO-ENTMCNC: 34.8 MG/DL (ref 33–36)
MCV RBC AUTO: 89.5 FL (ref 80–94)
MONOCYTES # BLD AUTO: 0.75 X10(3)/MCL (ref 0.1–1.3)
MONOCYTES NFR BLD AUTO: 7.4 %
NEUTROPHILS # BLD AUTO: 6.2 X10(3)/MCL (ref 2.1–9.2)
NEUTROPHILS NFR BLD AUTO: 61 %
NITRITE UR QL STRIP.AUTO: NEGATIVE
NRBC BLD AUTO-RTO: 0 %
PH UR STRIP.AUTO: 5.5 [PH]
PHOSPHATE SERPL-MCNC: 2.8 MG/DL (ref 2.3–4.7)
PLATELET # BLD AUTO: 272 X10(3)/MCL (ref 130–400)
PMV BLD AUTO: 9.5 FL (ref 7.4–10.4)
POTASSIUM SERPL-SCNC: 4.5 MMOL/L (ref 3.5–5.1)
PROT SERPL-MCNC: 7.2 GM/DL (ref 5.8–7.6)
PROT UR QL STRIP.AUTO: NEGATIVE MG/DL
PROT UR STRIP-MCNC: 8.8 MG/DL
PTH-INTACT SERPL-MCNC: 53.1 PG/ML (ref 8.7–77)
RBC # BLD AUTO: 4.94 X10(6)/MCL (ref 4.7–6.1)
RBC #/AREA URNS AUTO: <5 /HPF
RBC UR QL AUTO: NEGATIVE UNIT/L
SODIUM SERPL-SCNC: 139 MMOL/L (ref 136–145)
SP GR UR STRIP.AUTO: 1.02 (ref 1–1.03)
SQUAMOUS #/AREA URNS AUTO: <5 /HPF
UROBILINOGEN UR STRIP-ACNC: 0.2 MG/DL
WBC # SPEC AUTO: 10.2 X10(3)/MCL (ref 4.5–11.5)
WBC #/AREA URNS AUTO: <5 /HPF

## 2022-09-29 PROCEDURE — 85025 COMPLETE CBC W/AUTO DIFF WBC: CPT

## 2022-09-29 PROCEDURE — 82570 ASSAY OF URINE CREATININE: CPT

## 2022-09-29 PROCEDURE — 84100 ASSAY OF PHOSPHORUS: CPT

## 2022-09-29 PROCEDURE — 82042 OTHER SOURCE ALBUMIN QUAN EA: CPT

## 2022-09-29 PROCEDURE — 81001 URINALYSIS AUTO W/SCOPE: CPT

## 2022-09-29 PROCEDURE — 80053 COMPREHEN METABOLIC PANEL: CPT

## 2022-09-29 PROCEDURE — 83970 ASSAY OF PARATHORMONE: CPT

## 2022-09-29 PROCEDURE — 36415 COLL VENOUS BLD VENIPUNCTURE: CPT

## 2022-10-20 ENCOUNTER — OFFICE VISIT (OUTPATIENT)
Dept: NEPHROLOGY | Facility: CLINIC | Age: 77
End: 2022-10-20
Payer: MEDICARE

## 2022-10-20 VITALS
HEART RATE: 70 BPM | WEIGHT: 161.38 LBS | BODY MASS INDEX: 24.46 KG/M2 | TEMPERATURE: 98 F | HEIGHT: 68 IN | DIASTOLIC BLOOD PRESSURE: 78 MMHG | SYSTOLIC BLOOD PRESSURE: 120 MMHG | OXYGEN SATURATION: 93 % | RESPIRATION RATE: 20 BRPM

## 2022-10-20 DIAGNOSIS — N18.30 STAGE 3 CHRONIC KIDNEY DISEASE, UNSPECIFIED WHETHER STAGE 3A OR 3B CKD: Primary | ICD-10-CM

## 2022-10-20 DIAGNOSIS — D75.1 ERYTHROCYTOSIS: ICD-10-CM

## 2022-10-20 DIAGNOSIS — I10 HYPERTENSION, UNSPECIFIED TYPE: ICD-10-CM

## 2022-10-20 PROCEDURE — 99999 PR PBB SHADOW E&M-EST. PATIENT-LVL IV: ICD-10-PCS | Mod: PBBFAC,,, | Performed by: INTERNAL MEDICINE

## 2022-10-20 PROCEDURE — 99999 PR PBB SHADOW E&M-EST. PATIENT-LVL IV: CPT | Mod: PBBFAC,,, | Performed by: INTERNAL MEDICINE

## 2022-10-20 PROCEDURE — 99214 PR OFFICE/OUTPT VISIT, EST, LEVL IV, 30-39 MIN: ICD-10-PCS | Mod: S$PBB,,, | Performed by: INTERNAL MEDICINE

## 2022-10-20 PROCEDURE — 99214 OFFICE O/P EST MOD 30 MIN: CPT | Mod: S$PBB,,, | Performed by: INTERNAL MEDICINE

## 2022-10-20 PROCEDURE — 99214 OFFICE O/P EST MOD 30 MIN: CPT | Mod: PBBFAC | Performed by: INTERNAL MEDICINE

## 2022-10-20 NOTE — PROGRESS NOTES
[facility] Progress Note      HPI  Edwin Raman, 77 y.o. male,  has a past medical history of Advanced COPD, Arthritis, Bladder cancer, BPH (benign prostatic hyperplasia), CAD (coronary artery disease), GERD (gastroesophageal reflux disease), HLD (hyperlipidemia), Hypocalcemia, and Kidney cancer, primary, with metastasis from kidney to other site (2018). Edwin Raman presents to office for 6 month a follow up visit for CKD3a r/t nephrosclerosis.     Overall, feels good. He hasn't smoked in 2 years. Followed by pulmonary for spot on lung.    Last visit we dc vit D due to hypercalcemia.     No edema.    Patient denies taking NSAIDs, new antibiotics or recreational drugs. Denies recent episode of dehydration, diarrhea, vomiting, acute illness, hospitalization, recent angiograms or exposure to IV radiocontrast.      Medical History:   Past Medical History:   Diagnosis Date    Advanced COPD     Arthritis     Bladder cancer     BPH (benign prostatic hyperplasia)     CAD (coronary artery disease)     GERD (gastroesophageal reflux disease)     HLD (hyperlipidemia)     Hypocalcemia     Kidney cancer, primary, with metastasis from kidney to other site 2018       Surgical History:   Past Surgical History:   Procedure Laterality Date    CORONARY ARTERY BYPASS GRAFT  2009    heart surgery      Double bypass    KIDNEY SURGERY         Family History:   Family History   Problem Relation Age of Onset    Heart attack Father     Alzheimer's disease Father     Heart disease Father        Social History:   Social History     Tobacco Use    Smoking status: Former     Packs/day: 1.00     Years: 40.00     Pack years: 40.00     Types: Cigarettes     Start date: 1963     Quit date: 2020     Years since quittin.8    Smokeless tobacco: Never   Substance Use Topics    Alcohol use: Yes     Comment: very rare       Allergies:  Review of patient's allergies indicates:   Allergen Reactions     Ciprofloxacin Other (See Comments)       Medications:    Current Outpatient Medications:     albuterol (PROVENTIL/VENTOLIN HFA) 90 mcg/actuation inhaler, Inhale 2 puffs into the lungs once daily at 6am., Disp: , Rfl:     ascorbic acid, vitamin C, (VITAMIN C) 1000 MG tablet, Take 1,000 mg by mouth once daily., Disp: , Rfl:     aspirin (ECOTRIN) 81 MG EC tablet, Take 81 mg by mouth once daily., Disp: , Rfl:     atorvastatin (LIPITOR) 20 MG tablet, Take 20 mg by mouth once daily., Disp: , Rfl:     b complex vitamins capsule, Take 1 capsule by mouth once daily., Disp: , Rfl:     cetirizine (ZYRTEC) 10 MG tablet, Take 10 mg by mouth once daily., Disp: , Rfl:     famotidine (PEPCID) 40 MG tablet, Take 40 mg by mouth as needed for Heartburn., Disp: , Rfl:     finasteride (PROSCAR) 5 mg tablet, Take 5 mg by mouth once daily., Disp: , Rfl:     fluticasone propionate (FLONASE) 50 mcg/actuation nasal spray, 1 spray by Each Nostril route once daily., Disp: , Rfl:     multivitamin with minerals tablet, Take 1 tablet by mouth once daily., Disp: , Rfl:     omega-3 fatty acids/fish oil (FISH OIL-OMEGA-3 FATTY ACIDS) 300-1,000 mg capsule, Take 2 capsules by mouth once daily., Disp: , Rfl:     OXYGEN-AIR DELIVERY SYSTEMS MISC, 2 L by Misc.(Non-Drug; Combo Route) route as needed., Disp: , Rfl:     STIOLTO RESPIMAT 2.5-2.5 mcg/actuation Mist, Inhale 2 puffs into the lungs once daily at 6am., Disp: , Rfl:     tamsulosin (FLOMAX) 0.4 mg Cap, Take 1 capsule by mouth once daily., Disp: , Rfl:     zinc gluconate 50 mg tablet, Take 50 mg by mouth once daily., Disp: , Rfl:        ROS:    Constitutional: Denies fever, fatigue, generalized weakness, night sweats, or acute weight change  Skin: Denies wounds, no rashes, no itching, no new skin lesions  HEENT: Denies acute change in hearing or vision, tinnitus, or dysphagia  Respiratory:  Denies cough, shortness of breath, or wheezing  Cardiovascular: Denies chest pain, palpitations, or  "swelling  Gastrointestional: Denies abdominal pain, nausea, vomiting, diarrhea, or constipation  Genitourinary: Denies dysuria, hematuria, or incontinence; reports able to empty bladder  Musculoskeletal: Denies myalgias, back pain, decreased ROM or focal weakness  Neurological: Denies headaches, seizures, dizziness, paresthesias or weakness  Hematological: Denies unusual bruising or bleeding  Psychiatric: Denies hallucinations, depression, or confusion      Vital Signs:  /78 (BP Location: Left arm, Patient Position: Sitting)   Pulse 70   Temp 97.7 °F (36.5 °C) (Oral)   Resp 20   Ht 5' 8" (1.727 m)   Wt 73.2 kg (161 lb 6 oz)   SpO2 (!) 93%   BMI 24.54 kg/m²   Body mass index is 24.54 kg/m².      Physical Exam:    General: no acute distress, awake, alert  Eyes: PERRLA, EOMI, conjunctiva clear, eyelids without swelling  HENT: atraumatic, oropharynx and nasal mucosa patent  Neck: full ROM, no JVD, no thyromegaly or lymphadenopathy  Respiratory: equal, unlabored, dec BS bases, fine end exp wheezing  Cardiovascular: RRR without murmur or rub; radial and pedal pulses felt  Edema: none  Gastrointestinal: soft, non-tender, non-distended; positive bowel sounds; no masses to palpation  Genitourinary: no CVA tenderness upon palpation  Musculoskeletal: full ROM without limitation or discomfort  Integumentary: warm, dry; no rashes, wounds, or skin lesions  Neurological: oriented, appropriate, no acute deficits      Labs:        Component Value Date/Time     09/29/2022 1254     04/12/2022 0955    K 4.5 09/29/2022 1254    K 4.5 04/12/2022 0955    CO2 26 09/29/2022 1254    CO2 26 04/12/2022 0955    BUN 18.6 09/29/2022 1254    BUN 15.7 04/12/2022 0955    CREATININE 1.25 (H) 09/29/2022 1254    CREATININE 1.34 04/12/2022 0955    EGFRNONAA 55 04/12/2022 0955    EGFRNONAA 52 02/07/2022 1124    CALCIUM 9.6 09/29/2022 1254    CALCIUM 9.8 04/12/2022 0955    PHOS 2.8 09/29/2022 1254    PTH 53.1 09/29/2022 1254    "         Component Value Date/Time    WBC 10.2 09/29/2022 1254    WBC 11.1 05/19/2022 1414    HGB 15.4 09/29/2022 1254    HGB 14.7 05/19/2022 1414    HCT 44.2 09/29/2022 1254    HCT 44.7 05/19/2022 1414     09/29/2022 1254     05/19/2022 1414         Impression:    Patient Active Problem List   Diagnosis    COPD (chronic obstructive pulmonary disease)    Pulmonary nodule, right    CAD (coronary artery disease)    BPH (benign prostatic hyperplasia)    History of bladder cancer    GERD (gastroesophageal reflux disease)    CKD (chronic kidney disease), stage III    Allergic rhinitis     1. Stage 3 chronic kidney disease, unspecified whether stage 3a or 3b CKD        2. Hypertension, unspecified type        3. Erythrocytosis          Hypercalcemia resolved  CKD3a related to nephrosclerosis; stable cr down to 1.2      Plan:  Okay to FU in 1 year, labs within the week before    Avoid NSAIDs (Aleve, Mobic, Celebrex, Ibuprofen, Advil, Toradol and Diclofenac).       Brooklyn Weinstein MD

## 2023-04-04 ENCOUNTER — TELEPHONE (OUTPATIENT)
Dept: FAMILY MEDICINE | Facility: CLINIC | Age: 78
End: 2023-04-04
Payer: MEDICARE

## 2023-04-04 NOTE — TELEPHONE ENCOUNTER
Called and HONORIO for Elisabeth letting her know that wew have no lipid results on pt. I advised for her to return the call if she has any questions.

## 2023-04-04 NOTE — TELEPHONE ENCOUNTER
----- Message from Radha Escalante sent at 4/4/2023  3:56 PM CDT -----  Regarding: Test Results  Type:  Test Results    Who Called: Elisabeth tobias/ Dr. Simon's office  Name of Test (Lab/Mammo/Etc): Lipid Panel    Would the patient rather a call back or a response via [facility]?   Best Call Back Number: [phone]  Additional Information:  Elisabeth tobias/ Dr. Simon's office called to get Test results Lipid Panel that was done on the pt sent to them. Fax [phone]. Please contact Elisabeth.

## 2023-04-26 ENCOUNTER — APPOINTMENT (OUTPATIENT)
Dept: LAB | Facility: HOSPITAL | Age: 78
End: 2023-04-26
Attending: INTERNAL MEDICINE
Payer: MEDICARE

## 2023-04-26 DIAGNOSIS — Z79.899 ENCOUNTER FOR LONG-TERM (CURRENT) USE OF OTHER MEDICATIONS: ICD-10-CM

## 2023-04-26 DIAGNOSIS — I25.810 CORONARY ATHEROSCLEROSIS OF AUTOLOGOUS VEIN BYPASS GRAFT: Primary | ICD-10-CM

## 2023-04-26 DIAGNOSIS — E78.5 HYPERLIPIDEMIA, UNSPECIFIED HYPERLIPIDEMIA TYPE: ICD-10-CM

## 2023-04-26 LAB
ALBUMIN SERPL-MCNC: 3.8 G/DL (ref 3.4–4.8)
ALP SERPL-CCNC: 114 UNIT/L (ref 40–150)
ALT SERPL-CCNC: 26 UNIT/L (ref 0–55)
AST SERPL-CCNC: 22 UNIT/L (ref 5–34)
BILIRUBIN DIRECT+TOT PNL SERPL-MCNC: 0.2 MG/DL (ref 0–?)
BILIRUBIN DIRECT+TOT PNL SERPL-MCNC: 0.3 MG/DL (ref 0–0.8)
BILIRUBIN DIRECT+TOT PNL SERPL-MCNC: 0.5 MG/DL
CHOLEST SERPL-MCNC: 142 MG/DL
CHOLEST/HDLC SERPL: 3 {RATIO} (ref 0–5)
HDLC SERPL-MCNC: 42 MG/DL (ref 35–60)
LDLC SERPL CALC-MCNC: 67 MG/DL (ref 50–140)
PATH REV: NORMAL
PROT SERPL-MCNC: 7.1 GM/DL (ref 5.8–7.6)
TRIGL SERPL-MCNC: 167 MG/DL (ref 34–140)
VLDLC SERPL CALC-MCNC: 33 MG/DL

## 2023-04-26 PROCEDURE — 36415 COLL VENOUS BLD VENIPUNCTURE: CPT

## 2023-04-26 PROCEDURE — 80061 LIPID PANEL: CPT

## 2023-04-26 PROCEDURE — 80076 HEPATIC FUNCTION PANEL: CPT

## 2023-06-29 ENCOUNTER — LAB VISIT (OUTPATIENT)
Dept: LAB | Facility: HOSPITAL | Age: 78
End: 2023-06-29
Attending: STUDENT IN AN ORGANIZED HEALTH CARE EDUCATION/TRAINING PROGRAM
Payer: MEDICARE

## 2023-06-29 ENCOUNTER — OFFICE VISIT (OUTPATIENT)
Dept: FAMILY MEDICINE | Facility: CLINIC | Age: 78
End: 2023-06-29
Payer: MEDICARE

## 2023-06-29 VITALS
HEART RATE: 65 BPM | WEIGHT: 163 LBS | DIASTOLIC BLOOD PRESSURE: 80 MMHG | BODY MASS INDEX: 24.71 KG/M2 | OXYGEN SATURATION: 100 % | HEIGHT: 68 IN | RESPIRATION RATE: 18 BRPM | SYSTOLIC BLOOD PRESSURE: 140 MMHG | TEMPERATURE: 97 F

## 2023-06-29 DIAGNOSIS — I25.10 CORONARY ARTERY DISEASE, UNSPECIFIED VESSEL OR LESION TYPE, UNSPECIFIED WHETHER ANGINA PRESENT, UNSPECIFIED WHETHER NATIVE OR TRANSPLANTED HEART: ICD-10-CM

## 2023-06-29 DIAGNOSIS — Z11.59 ENCOUNTER FOR HEPATITIS C SCREENING TEST FOR LOW RISK PATIENT: ICD-10-CM

## 2023-06-29 DIAGNOSIS — N40.0 BENIGN PROSTATIC HYPERPLASIA, UNSPECIFIED WHETHER LOWER URINARY TRACT SYMPTOMS PRESENT: ICD-10-CM

## 2023-06-29 DIAGNOSIS — J30.9 ALLERGIC RHINITIS, UNSPECIFIED SEASONALITY, UNSPECIFIED TRIGGER: ICD-10-CM

## 2023-06-29 DIAGNOSIS — Z00.00 MEDICARE ANNUAL WELLNESS VISIT, SUBSEQUENT: Primary | ICD-10-CM

## 2023-06-29 DIAGNOSIS — N18.30 STAGE 3 CHRONIC KIDNEY DISEASE, UNSPECIFIED WHETHER STAGE 3A OR 3B CKD: ICD-10-CM

## 2023-06-29 DIAGNOSIS — J44.9 CHRONIC OBSTRUCTIVE PULMONARY DISEASE, UNSPECIFIED COPD TYPE: ICD-10-CM

## 2023-06-29 DIAGNOSIS — K21.9 GASTROESOPHAGEAL REFLUX DISEASE, UNSPECIFIED WHETHER ESOPHAGITIS PRESENT: ICD-10-CM

## 2023-06-29 DIAGNOSIS — Z00.00 MEDICARE ANNUAL WELLNESS VISIT, SUBSEQUENT: ICD-10-CM

## 2023-06-29 DIAGNOSIS — R91.1 PULMONARY NODULE, RIGHT: ICD-10-CM

## 2023-06-29 DIAGNOSIS — D75.1 POLYCYTHEMIA, SECONDARY: ICD-10-CM

## 2023-06-29 DIAGNOSIS — Z85.51 HISTORY OF BLADDER CANCER: ICD-10-CM

## 2023-06-29 PROBLEM — I73.9 PERIPHERAL VASCULAR DISEASE, UNSPECIFIED: Status: ACTIVE | Noted: 2023-06-29

## 2023-06-29 PROBLEM — D45 POLYCYTHEMIA VERA: Status: ACTIVE | Noted: 2023-06-29

## 2023-06-29 PROBLEM — D45 POLYCYTHEMIA VERA: Status: RESOLVED | Noted: 2023-06-29 | Resolved: 2023-06-29

## 2023-06-29 PROBLEM — I73.9 PERIPHERAL VASCULAR DISEASE, UNSPECIFIED: Status: RESOLVED | Noted: 2023-06-29 | Resolved: 2023-06-29

## 2023-06-29 LAB — HCV AB SERPL QL IA: NONREACTIVE

## 2023-06-29 PROCEDURE — 86803 HEPATITIS C AB TEST: CPT

## 2023-06-29 PROCEDURE — G0439 PPPS, SUBSEQ VISIT: HCPCS | Mod: ,,, | Performed by: STUDENT IN AN ORGANIZED HEALTH CARE EDUCATION/TRAINING PROGRAM

## 2023-06-29 PROCEDURE — 36415 COLL VENOUS BLD VENIPUNCTURE: CPT

## 2023-06-29 PROCEDURE — G0439 PR MEDICARE ANNUAL WELLNESS SUBSEQUENT VISIT: ICD-10-PCS | Mod: ,,, | Performed by: STUDENT IN AN ORGANIZED HEALTH CARE EDUCATION/TRAINING PROGRAM

## 2023-06-29 NOTE — ASSESSMENT & PLAN NOTE
- Stable.  - Patient following with Dr. Parvez Leigh with Pulmonology.  - Pulmonology office visit from 07/11/22 reviewed.  - He had COVID in 11/2020 and stopped smoking since that time.  - He states compliance with his Stiolto Respimat daily.

## 2023-06-29 NOTE — ASSESSMENT & PLAN NOTE
- Stable.  - Patient following with Dr. Brooklyn Muse with Nephrology.   - Nephrology office visit from 10/20/22 reviewed.

## 2023-06-29 NOTE — ASSESSMENT & PLAN NOTE
- Stable.  - Patient following with Dr. Demarco Munoz with Urology.  - He is taking Finasteride and Flomax.

## 2023-06-29 NOTE — PROGRESS NOTES
Subjective:      Patient ID: Edwin Raman is a 78 y.o.  male. He is accompanied by his wife, Mrs. Dana Raman.    Chief Complaint: Medicare Wellness    COPD/Hx of Right Pulmonary Nodule: Patient following with Dr. Parvez Leigh with Pulmonology. He had COVID in 11/2020 and stopped smoking since that time. He states compliance with his Stiolto Respimat daily. He denies any shortness of breath or productive cough.     CAD s/p CABG x2: Patient following with Dr. Nas Simon with Cardiology. Patient taking Aspirin and Lipitor. Patient not able to take beta blocker due to low HR. He denies any chest pain.    BPH/Hx of Bladder Cancer: Patient following with Dr. Demarco Munoz with Urology. Patient had metastases to the kidney and treated with surgery and chemo in 2012. He is taking Finasteride and Flomax. He denies any hematuria.    CKD III: Patient following with Dr. Brooklyn Muse with Nephrology.    GERD: Patient taking Pepcid PRN.    Secondary Polycythemia: Patient evaluated Dr. Theresa Guardado with Hematology-Oncology. JAK2 negative and Epo level negative. Polycythemia most likely reactive to COPD.    Review of Systems   Constitutional:  Negative for activity change, appetite change, chills, diaphoresis, fatigue, fever and unexpected weight change.   Eyes:  Negative for visual disturbance.   Respiratory:  Negative for apnea, cough and shortness of breath.    Cardiovascular:  Negative for chest pain, palpitations and leg swelling.   Gastrointestinal:  Negative for abdominal pain, blood in stool, constipation, diarrhea, nausea and vomiting.   Genitourinary:  Negative for dysuria and hematuria.   Musculoskeletal:  Negative for arthralgias.   Skin:  Negative for rash and wound.   Allergic/Immunologic: Positive for environmental allergies.   Neurological:  Negative for dizziness, syncope, weakness, numbness and headaches.   Psychiatric/Behavioral:  Negative for behavioral problems, dysphoric mood and sleep  "disturbance. The patient is not nervous/anxious.      Objective:   BP (!) 140/80 (BP Location: Right arm, Patient Position: Sitting, BP Method: Small (Automatic))   Pulse 65   Temp 97.3 °F (36.3 °C) (Temporal)   Resp 18   Ht 5' 8" (1.727 m)   Wt 73.9 kg (163 lb)   SpO2 100%   BMI 24.78 kg/m²     Physical Exam  Vitals and nursing note reviewed.   Constitutional:       General: He is not in acute distress.     Appearance: Normal appearance. He is not ill-appearing or toxic-appearing.   HENT:      Head: Normocephalic and atraumatic.      Mouth/Throat:      Mouth: Mucous membranes are moist.      Pharynx: Oropharynx is clear.   Eyes:      Conjunctiva/sclera: Conjunctivae normal.   Cardiovascular:      Rate and Rhythm: Normal rate and regular rhythm.      Heart sounds: Normal heart sounds. No murmur heard.  Pulmonary:      Effort: Pulmonary effort is normal. No respiratory distress.      Breath sounds: Normal breath sounds. No wheezing.   Abdominal:      General: Bowel sounds are normal. There is no distension.      Palpations: Abdomen is soft.      Tenderness: There is no abdominal tenderness.   Musculoskeletal:         General: No deformity. Normal range of motion.      Cervical back: Normal range of motion and neck supple.      Right lower leg: No edema.      Left lower leg: No edema.   Skin:     General: Skin is warm and dry.      Findings: No lesion or rash.   Neurological:      General: No focal deficit present.      Mental Status: He is alert. Mental status is at baseline.      Motor: No weakness.      Coordination: Coordination normal.   Psychiatric:         Mood and Affect: Mood normal.         Behavior: Behavior normal.         Thought Content: Thought content normal.         Judgment: Judgment normal.     Assessment/Plan:   1. Medicare annual wellness visit, subsequent  Comments:  - Health maintenance reviewed.  Orders:  -     Hepatitis C Antibody; Future; Expected date: 06/29/2023    2. Encounter for " hepatitis C screening test for low risk patient  -     Hepatitis C Antibody; Future; Expected date: 06/29/2023    3. Chronic obstructive pulmonary disease, unspecified COPD type  Assessment & Plan:  - Stable.  - Patient following with Dr. Parvez Leigh with Pulmonology.  - Pulmonology office visit from 07/11/22 reviewed.  - He had COVID in 11/2020 and stopped smoking since that time.  - He states compliance with his Stiolto Respimat daily.        4. Pulmonary nodule, right  Assessment & Plan:  - CT scans/surveillance per Pulmonology.        5. Coronary artery disease, unspecified vessel or lesion type, unspecified whether angina present, unspecified whether native or transplanted heart  Assessment & Plan:  - Patient following with Dr. Nas Simon with Cardiology.  - Patient taking Aspirin and Lipitor.  - Patient not able to take beta blocker due to low HR.        6. Benign prostatic hyperplasia, unspecified whether lower urinary tract symptoms present  Assessment & Plan:  - Stable.  - Patient following with Dr. Demarco Munoz with Urology.  - He is taking Finasteride and Flomax.        7. History of bladder cancer  Assessment & Plan:  - Patient had metastases to the kidney and treated with surgery and chemo in 2012.         8. Stage 3 chronic kidney disease, unspecified whether stage 3a or 3b CKD  Assessment & Plan:  - Stable.  - Patient following with Dr. Brooklyn Muse with Nephrology.   - Nephrology office visit from 10/20/22 reviewed.       9. Gastroesophageal reflux disease, unspecified whether esophagitis present  Assessment & Plan:  - Stable.  - Continue Pepcid PRN.      10. Allergic rhinitis, unspecified seasonality, unspecified trigger  Assessment & Plan:  - Stable.  - Continue Flonase and Zyrtec.      11. Polycythemia, secondary  Assessment & Plan:  - Patient evaluated Dr. Theresa Guardado with Hematology-Oncology.   - Hematology-Oncology office visit from 06/09/22 reviewed.  - JAK2 negative and Epo level  negative.   - Polycythemia most likely reactive to COPD.  - No further evaluation.      Opioid Screening: Patient medication list reviewed, patient is not taking prescription opioids. Patient is not using additional opioids than prescribed. Patient is at low risk of substance abuse based on this opioid use history.     Patient Reported Health Risk Assessment  [age] 70-80  Are you male or female?: Male  During the past four weeks, how much have you been bothered by emotional problems such as feeling anxious, depressed, irritable, sad, or downhearted and blue?: Not at all  During the past five weeks, has your physical and/or emotional health limited your social activities with family, friends, neighbors, or groups?: Not at all  During the past four weeks, how much bodily pain have you generally had?: No pain  During the past four weeks, was someone available to help if you needed and wanted help?: Yes, as much as I wanted  During the past four weeks, what was the hardest physical activity you could do for at least two minutes?: Moderate  Can you get to places out of walking distance without help?  (For example, can you travel alone on buses or taxis, or drive your own car?): Yes  Can you go shopping for groceries or clothes without someone's help?: Yes  Can you prepare your own meals?: Yes  Can you do your own housework without help?: Yes  Because of any health problems, do you need the help of another person with your personal care needs such as eating, bathing, dressing, or getting around the house?: Yes  Can you handle your own money without help?: Yes  During the past four weeks, how would you rate your health in general?: Good  How have things been going for you during the past four weeks?: Very well  Are you having difficulties driving your car?: Yes, often  Do you always fasten your seat belt when you are in a car?: Yes, usually  How often in the past four weeks have you been bothered by falling or  dizzy when standing up?: Sometimes  How often in the past four weeks have you been bothered by sexual problems?: Never  How often in the past four weeks have you been bothered by trouble eating well?: Never  How often in the past four weeks have you been bothered by teeth or denture problems?: Never  How often in the past four weeks have you been bothered with problems using the telephone?: Never  How often in the past four weeks have you been bothered by tiredness or fatigue?: Never  Have you fallen two or more times in the past year?: No  Are you afraid of falling?: No  Are you a smoker?: No  During the past four weeks, how many drinks of wine, beer, or other alcoholic beverages did you have?: No alcohol at all  Do you exercise for about 20 minutes three or more days a week?: No, I usually do not exercise this much  Have you been given any information to help you with hazards in your house that might hurt you?: No  Have you been given any information to help you with keeping track of your medications?: No  How often do you have trouble taking medicines the way you've been told to take them?: I do not have to take medicine  How confident are you that you can control and manage most of your health problems?: Not very confident  What is your race? (Check all that apply.):     Medicare Annual Wellness and Personalized Prevention Plan:   Fall Risk + Home Safety + Hearing Impairment + Depression Screen + Opioid and Substance Abuse Screening + Cognitive Impairment Screen + Health Risk Assessment all reviewed.     Advance Care Planning   I attest to discussing Advance Care Planning with patient and/or family member.  Education was provided including the importance of the Health Care Power of , Advance Directives, and/or [place] documentation.  The patient expressed understanding to the importance of this information and discussion.       Follow up in about 1 year (around 6/29/2024) for Medicare Wellness.

## 2023-10-16 ENCOUNTER — LAB VISIT (OUTPATIENT)
Dept: LAB | Facility: HOSPITAL | Age: 78
End: 2023-10-16
Attending: INTERNAL MEDICINE
Payer: MEDICARE

## 2023-10-16 DIAGNOSIS — I10 HYPERTENSION, UNSPECIFIED TYPE: ICD-10-CM

## 2023-10-16 DIAGNOSIS — N18.30 STAGE 3 CHRONIC KIDNEY DISEASE, UNSPECIFIED WHETHER STAGE 3A OR 3B CKD: ICD-10-CM

## 2023-10-16 LAB
ALBUMIN SERPL-MCNC: 3.7 G/DL (ref 3.4–4.8)
ALBUMIN/GLOB SERPL: 1.1 RATIO (ref 1.1–2)
ALP SERPL-CCNC: 109 UNIT/L (ref 40–150)
ALT SERPL-CCNC: 19 UNIT/L (ref 0–55)
APPEARANCE UR: CLEAR
AST SERPL-CCNC: 21 UNIT/L (ref 5–34)
BACTERIA #/AREA URNS AUTO: NORMAL /HPF
BASOPHILS # BLD AUTO: 0.07 X10(3)/MCL
BASOPHILS NFR BLD AUTO: 0.7 %
BILIRUB SERPL-MCNC: 0.8 MG/DL
BILIRUB UR QL STRIP.AUTO: NEGATIVE
BUN SERPL-MCNC: 19.3 MG/DL (ref 8.4–25.7)
CALCIUM SERPL-MCNC: 8.9 MG/DL (ref 8.8–10)
CHLORIDE SERPL-SCNC: 107 MMOL/L (ref 98–107)
CO2 SERPL-SCNC: 26 MMOL/L (ref 23–31)
COLOR UR AUTO: NORMAL
CREAT SERPL-MCNC: 1.26 MG/DL (ref 0.73–1.18)
CREAT UR-MCNC: 197.3 MG/DL (ref 63–166)
EOSINOPHIL # BLD AUTO: 0.34 X10(3)/MCL (ref 0–0.9)
EOSINOPHIL NFR BLD AUTO: 3.2 %
ERYTHROCYTE [DISTWIDTH] IN BLOOD BY AUTOMATED COUNT: 13.8 % (ref 11.5–17)
GFR SERPLBLD CREATININE-BSD FMLA CKD-EPI: 59 MLS/MIN/1.73/M2
GLOBULIN SER-MCNC: 3.4 GM/DL (ref 2.4–3.5)
GLUCOSE SERPL-MCNC: 126 MG/DL (ref 82–115)
GLUCOSE UR QL STRIP.AUTO: NEGATIVE
HCT VFR BLD AUTO: 46.6 % (ref 42–52)
HGB BLD-MCNC: 15.1 G/DL (ref 14–18)
IMM GRANULOCYTES # BLD AUTO: 0.04 X10(3)/MCL (ref 0–0.04)
IMM GRANULOCYTES NFR BLD AUTO: 0.4 %
KETONES UR QL STRIP.AUTO: NEGATIVE
LEUKOCYTE ESTERASE UR QL STRIP.AUTO: NEGATIVE
LYMPHOCYTES # BLD AUTO: 3.57 X10(3)/MCL (ref 0.6–4.6)
LYMPHOCYTES NFR BLD AUTO: 34 %
MCH RBC QN AUTO: 30.9 PG (ref 27–31)
MCHC RBC AUTO-ENTMCNC: 32.4 G/DL (ref 33–36)
MCV RBC AUTO: 95.5 FL (ref 80–94)
MONOCYTES # BLD AUTO: 0.73 X10(3)/MCL (ref 0.1–1.3)
MONOCYTES NFR BLD AUTO: 7 %
NEUTROPHILS # BLD AUTO: 5.75 X10(3)/MCL (ref 2.1–9.2)
NEUTROPHILS NFR BLD AUTO: 54.7 %
NITRITE UR QL STRIP.AUTO: NEGATIVE
NRBC BLD AUTO-RTO: 0 %
PH UR STRIP.AUTO: 5.5 [PH]
PHOSPHATE SERPL-MCNC: 3 MG/DL (ref 2.3–4.7)
PLATELET # BLD AUTO: 214 X10(3)/MCL (ref 130–400)
PMV BLD AUTO: 9 FL (ref 7.4–10.4)
POTASSIUM SERPL-SCNC: 4.3 MMOL/L (ref 3.5–5.1)
PROT SERPL-MCNC: 7.1 GM/DL (ref 5.8–7.6)
PROT UR QL STRIP.AUTO: NEGATIVE
PROT UR STRIP-MCNC: 11 MG/DL
PTH-INTACT SERPL-MCNC: 97.1 PG/ML (ref 8.7–77)
RBC # BLD AUTO: 4.88 X10(6)/MCL (ref 4.7–6.1)
RBC #/AREA URNS AUTO: <5 /HPF
RBC UR QL AUTO: NEGATIVE
SODIUM SERPL-SCNC: 141 MMOL/L (ref 136–145)
SP GR UR STRIP.AUTO: 1.02 (ref 1–1.03)
SQUAMOUS #/AREA URNS AUTO: <5 /HPF
URINE PROTEIN/CREATININE RATIO (OHS): 0.1
UROBILINOGEN UR STRIP-ACNC: 1
WBC # SPEC AUTO: 10.5 X10(3)/MCL (ref 4.5–11.5)
WBC #/AREA URNS AUTO: <5 /HPF

## 2023-10-16 PROCEDURE — 36415 COLL VENOUS BLD VENIPUNCTURE: CPT

## 2023-10-16 PROCEDURE — 81001 URINALYSIS AUTO W/SCOPE: CPT

## 2023-10-16 PROCEDURE — 84100 ASSAY OF PHOSPHORUS: CPT

## 2023-10-16 PROCEDURE — 83970 ASSAY OF PARATHORMONE: CPT

## 2023-10-16 PROCEDURE — 85025 COMPLETE CBC W/AUTO DIFF WBC: CPT

## 2023-10-16 PROCEDURE — 80053 COMPREHEN METABOLIC PANEL: CPT

## 2023-10-16 PROCEDURE — 82570 ASSAY OF URINE CREATININE: CPT

## 2023-10-19 ENCOUNTER — OFFICE VISIT (OUTPATIENT)
Dept: NEPHROLOGY | Facility: CLINIC | Age: 78
End: 2023-10-19
Payer: MEDICARE

## 2023-10-19 VITALS
TEMPERATURE: 98 F | DIASTOLIC BLOOD PRESSURE: 84 MMHG | OXYGEN SATURATION: 94 % | BODY MASS INDEX: 24.52 KG/M2 | HEART RATE: 69 BPM | SYSTOLIC BLOOD PRESSURE: 130 MMHG | RESPIRATION RATE: 20 BRPM | WEIGHT: 161.81 LBS | HEIGHT: 68 IN

## 2023-10-19 DIAGNOSIS — N18.30 STAGE 3 CHRONIC KIDNEY DISEASE, UNSPECIFIED WHETHER STAGE 3A OR 3B CKD: Primary | ICD-10-CM

## 2023-10-19 DIAGNOSIS — I10 HYPERTENSION, UNSPECIFIED TYPE: ICD-10-CM

## 2023-10-19 DIAGNOSIS — D75.1 ERYTHROCYTOSIS: ICD-10-CM

## 2023-10-19 PROCEDURE — 99999 PR PBB SHADOW E&M-EST. PATIENT-LVL IV: ICD-10-PCS | Mod: PBBFAC,,, | Performed by: INTERNAL MEDICINE

## 2023-10-19 PROCEDURE — 99999 PR PBB SHADOW E&M-EST. PATIENT-LVL IV: CPT | Mod: PBBFAC,,, | Performed by: INTERNAL MEDICINE

## 2023-10-19 PROCEDURE — 99213 PR OFFICE/OUTPT VISIT, EST, LEVL III, 20-29 MIN: ICD-10-PCS | Mod: S$PBB,,, | Performed by: INTERNAL MEDICINE

## 2023-10-19 PROCEDURE — 99213 OFFICE O/P EST LOW 20 MIN: CPT | Mod: S$PBB,,, | Performed by: INTERNAL MEDICINE

## 2023-10-19 PROCEDURE — 99214 OFFICE O/P EST MOD 30 MIN: CPT | Mod: PBBFAC | Performed by: INTERNAL MEDICINE

## 2023-10-19 NOTE — PROGRESS NOTES
[facility] Ambulatory Progress Note      HPI  Edwin Raman, 78 y.o. 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  Patient feels fine denies any major complaints.    Patient denies taking NSAIDs or new antibiotics. Also denies recent episode of dehydration, diarrhea, vomiting, acute illness, hospitalization, recent angiograms or exposure to IV radiocontrast.        Medical Diagnoses:   Past Medical History:   Diagnosis Date    Advanced COPD     Arthritis     Bladder cancer     BPH (benign prostatic hyperplasia)     CAD (coronary artery disease)     GERD (gastroesophageal reflux disease)     HLD (hyperlipidemia)     Hypocalcemia     Kidney cancer, primary, with metastasis from kidney to other site 2018     Patient Active Problem List   Diagnosis    COPD (chronic obstructive pulmonary disease)    Pulmonary nodule, right    CAD (coronary artery disease)    BPH (benign prostatic hyperplasia)    History of bladder cancer    GERD (gastroesophageal reflux disease)    CKD (chronic kidney disease), stage III    Allergic rhinitis       Surgical History:   Past Surgical History:   Procedure Laterality Date    CORONARY ARTERY BYPASS GRAFT  December 2009    heart surgery  2009    Double bypass    KIDNEY SURGERY  2012       Family History:   Family History   Problem Relation Age of Onset    Heart attack Father     Alzheimer's disease Father     Heart disease Father        Social History:   Social History     Tobacco Use    Smoking status: Former     Current packs/day: 0.00     Average packs/day: 1 pack/day for 57.0 years (57.0 ttl pk-yrs)     Types: Cigarettes     Start date: 1/5/1963     Quit date: 1/1/2020     Years since quitting: 3.8     Passive exposure: Past    Smokeless tobacco: Never   Substance Use Topics    Alcohol use: Yes     Comment: very rare       Allergies:  Review of patient's allergies indicates:   Allergen Reactions    Ciprofloxacin Other (See Comments)        Medications:    Current Outpatient Medications:     albuterol (PROVENTIL/VENTOLIN HFA) 90 mcg/actuation inhaler, Inhale 2 puffs into the lungs every 6 (six) hours as needed for Wheezing., Disp: 18 g, Rfl: 1    ascorbic acid, vitamin C, (VITAMIN C) 1000 MG tablet, Take 1,000 mg by mouth once daily., Disp: , Rfl:     aspirin (ECOTRIN) 81 MG EC tablet, Take 81 mg by mouth once daily., Disp: , Rfl:     atorvastatin (LIPITOR) 20 MG tablet, Take 20 mg by mouth once daily., Disp: , Rfl:     b complex vitamins capsule, Take 1 capsule by mouth once daily., Disp: , Rfl:     cetirizine (ZYRTEC) 10 MG tablet, Take 10 mg by mouth once daily., Disp: , Rfl:     famotidine (PEPCID) 40 MG tablet, Take 40 mg by mouth as needed for Heartburn., Disp: , Rfl:     finasteride (PROSCAR) 5 mg tablet, Take 5 mg by mouth once daily., Disp: , Rfl:     fluticasone propionate (FLONASE) 50 mcg/actuation nasal spray, 1 spray by Each Nostril route once daily., Disp: , Rfl:     multivitamin with minerals tablet, Take 1 tablet by mouth once daily., Disp: , Rfl:     omega-3 fatty acids/fish oil (FISH OIL-OMEGA-3 FATTY ACIDS) 300-1,000 mg capsule, Take 2 capsules by mouth once daily., Disp: , Rfl:     OXYGEN-AIR DELIVERY SYSTEMS MISC, 2 L by Misc.(Non-Drug; Combo Route) route as needed., Disp: , Rfl:     STIOLTO RESPIMAT 2.5-2.5 mcg/actuation Mist, Inhale 2 puffs into the lungs once daily., Disp: 4 g, Rfl: 11    tamsulosin (FLOMAX) 0.4 mg Cap, Take 1 capsule by mouth once daily., Disp: , Rfl:     zinc gluconate 50 mg tablet, Take 50 mg by mouth once daily., Disp: , Rfl:        Review of Systems:    Constitutional: Denies fever, fatigue, generalized weakness  Skin: Denies wounds, no rashes, no itching, no new skin lesions  Respiratory:  Denies cough, shortness of breath, or wheezing  Cardiovascular: Denies chest pain, palpitations, or swelling  Gastrointestional: Denies abdominal pain, nausea, vomiting, diarrhea, or constipation  Genitourinary:  "Denies dysuria, hematuria, foamy urine, or incontinence; reports able to empty bladder  Musculoskeletal: Denies back or flank pain  Neurological: Denies headaches, dizziness, paresthesias, tremors or focal weakness      Vital Signs:  /84 (BP Location: Left arm, Patient Position: Sitting)   Pulse 69   Temp 98.2 °F (36.8 °C) (Temporal)   Resp 20   Ht 5' 8" (1.727 m)   Wt 73.4 kg (161 lb 12.8 oz)   SpO2 (!) 94%   BMI 24.60 kg/m²   Body mass index is 24.6 kg/m².      Physical Exam:    General: no acute distress, awake, alert  Eyes: conjunctiva clear, eyelids without swelling  HENT: atraumatic, oropharynx and nasal mucosa patent  Neck: supple, trache midline, no JVD  Respiratory: equal, unlabored, clear to auscultation A/P  Cardiovascular: RRR without murmur or rub  Edema: none  Gastrointestinal: soft, non-tender, non-distended; positive bowel sounds; no masses to palpation; no ascites  Genitourinary: no CVA tenderness upon palpation  Musculoskeletal: ROM without new limitation or discomfort  Integumentary: warm, dry; no rashes, wounds, or skin lesions  Neurological: oriented x4, appropriate, no acute deficits; no asterixis      Labs:        Component Value Date/Time     10/16/2023 1447     09/29/2022 1254    K 4.3 10/16/2023 1447    K 4.5 09/29/2022 1254    CO2 26 10/16/2023 1447    CO2 26 09/29/2022 1254    BUN 19.3 10/16/2023 1447    BUN 18.6 09/29/2022 1254    CREATININE 1.26 (H) 10/16/2023 1447    CREATININE 1.25 (H) 09/29/2022 1254    CREATININE 1.34 04/12/2022 0955    CREATININE 1.42 02/07/2022 1124    CALCIUM 8.9 10/16/2023 1447    CALCIUM 9.6 09/29/2022 1254    PHOS 3.0 10/16/2023 1447    PHOS 2.8 09/29/2022 1254    PTH 97.1 (H) 10/16/2023 1447    PTH 53.1 09/29/2022 1254    PTH 81.3 04/12/2022 0955           Component Value Date/Time    WBC 10.50 10/16/2023 1447    WBC 10.2 09/29/2022 1254    HGB 15.1 10/16/2023 1447    HGB 15.4 09/29/2022 1254    HCT 46.6 10/16/2023 1447    HCT 44.2 " 09/29/2022 1254     10/16/2023 1447     09/29/2022 1254       Urine Creatinine   Date Value Ref Range Status   10/16/2023 197.3 (H) 63.0 - 166.0 mg/dL Final   09/29/2022 174.5 (H) 63.0 - 166.0 mg/dL Final       Urine Protein Level   Date Value Ref Range Status   10/16/2023 11.0 mg/dL Final   09/29/2022 8.8 mg/dL Final           Imaging:  Retroperitoneal US:      Impression:    1. Stage 3 chronic kidney disease, unspecified whether stage 3a or 3b CKD        2. Hypertension, unspecified type        3. 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  Renal function very stable    Plan:    Renal wise stable I will follow back in 1 year    Brooklyn Weinstein      This note was created with the assistance of [employer] voice recognition software or phone dictation. There may be transcription errors as a result of using this technology however minimal. Effort has been made to assure accuracy of transcription but any obvious errors or omissions should be clarified with the author of the document.

## 2024-06-24 ENCOUNTER — TELEPHONE (OUTPATIENT)
Dept: FAMILY MEDICINE | Facility: CLINIC | Age: 79
End: 2024-06-24
Payer: MEDICARE

## 2024-06-24 NOTE — TELEPHONE ENCOUNTER
Recent lipid panel from 4/26/23 faxed to [facility] at this time.

## 2024-06-24 NOTE — TELEPHONE ENCOUNTER
----- Message from Polly Arredondo sent at 6/24/2024 11:26 AM CDT -----  Regarding: Labs  .Who Called: [facility]    Caller is requesting assistance/information from provider's office.    Symptoms (please be specific):    How long has patient had these symptoms:    List of preferred pharmacies on file (remove unneeded): [unfilled]  If different, enter pharmacy into here including location and phone number:       Preferred Method of Contact: Phone Call  Patient's Preferred Phone Number on File: [phone]   Best Call Back Number, if different:  Additional Information: Requesting most recent lipid lab results to be faxed over to [phone].

## 2024-07-09 ENCOUNTER — LAB VISIT (OUTPATIENT)
Dept: LAB | Facility: HOSPITAL | Age: 79
End: 2024-07-09
Attending: STUDENT IN AN ORGANIZED HEALTH CARE EDUCATION/TRAINING PROGRAM
Payer: MEDICARE

## 2024-07-09 ENCOUNTER — OFFICE VISIT (OUTPATIENT)
Dept: FAMILY MEDICINE | Facility: CLINIC | Age: 79
End: 2024-07-09
Payer: MEDICARE

## 2024-07-09 VITALS
HEIGHT: 68 IN | RESPIRATION RATE: 16 BRPM | TEMPERATURE: 98 F | SYSTOLIC BLOOD PRESSURE: 97 MMHG | WEIGHT: 162.13 LBS | HEART RATE: 71 BPM | OXYGEN SATURATION: 93 % | DIASTOLIC BLOOD PRESSURE: 66 MMHG | BODY MASS INDEX: 24.57 KG/M2

## 2024-07-09 DIAGNOSIS — Z85.51 HISTORY OF BLADDER CANCER: ICD-10-CM

## 2024-07-09 DIAGNOSIS — R91.1 PULMONARY NODULE, RIGHT: ICD-10-CM

## 2024-07-09 DIAGNOSIS — I25.10 CORONARY ARTERY DISEASE, UNSPECIFIED VESSEL OR LESION TYPE, UNSPECIFIED WHETHER ANGINA PRESENT, UNSPECIFIED WHETHER NATIVE OR TRANSPLANTED HEART: ICD-10-CM

## 2024-07-09 DIAGNOSIS — J44.9 CHRONIC OBSTRUCTIVE PULMONARY DISEASE, UNSPECIFIED COPD TYPE: ICD-10-CM

## 2024-07-09 DIAGNOSIS — N18.31 CHRONIC KIDNEY DISEASE, STAGE 3A: ICD-10-CM

## 2024-07-09 DIAGNOSIS — R73.9 HYPERGLYCEMIA: ICD-10-CM

## 2024-07-09 DIAGNOSIS — J30.9 ALLERGIC RHINITIS, UNSPECIFIED SEASONALITY, UNSPECIFIED TRIGGER: ICD-10-CM

## 2024-07-09 DIAGNOSIS — K21.9 GASTROESOPHAGEAL REFLUX DISEASE, UNSPECIFIED WHETHER ESOPHAGITIS PRESENT: ICD-10-CM

## 2024-07-09 DIAGNOSIS — N18.30 STAGE 3 CHRONIC KIDNEY DISEASE, UNSPECIFIED WHETHER STAGE 3A OR 3B CKD: ICD-10-CM

## 2024-07-09 DIAGNOSIS — H91.93 BILATERAL HEARING LOSS, UNSPECIFIED HEARING LOSS TYPE: ICD-10-CM

## 2024-07-09 DIAGNOSIS — N40.0 BENIGN PROSTATIC HYPERPLASIA, UNSPECIFIED WHETHER LOWER URINARY TRACT SYMPTOMS PRESENT: ICD-10-CM

## 2024-07-09 DIAGNOSIS — Z00.00 MEDICARE ANNUAL WELLNESS VISIT, SUBSEQUENT: Primary | ICD-10-CM

## 2024-07-09 LAB
EST. AVERAGE GLUCOSE BLD GHB EST-MCNC: 119.8 MG/DL
HBA1C MFR BLD: 5.8 %

## 2024-07-09 PROCEDURE — 36415 COLL VENOUS BLD VENIPUNCTURE: CPT

## 2024-07-09 PROCEDURE — 83036 HEMOGLOBIN GLYCOSYLATED A1C: CPT

## 2024-07-09 PROCEDURE — G0439 PPPS, SUBSEQ VISIT: HCPCS | Mod: ,,, | Performed by: STUDENT IN AN ORGANIZED HEALTH CARE EDUCATION/TRAINING PROGRAM

## 2024-07-09 NOTE — PROGRESS NOTES
Subjective:      Patient ID: Edwin Raman is a 79 y.o.  male. He is accompanied by his wife, Mrs. Dana Raman.     Chief Complaint: Medicare Wellness    COPD/Hx of Right Pulmonary Nodule: Patient following with Pulmonology. He had COVID in 11/2020 and stopped smoking since that time. He states compliance with his Stiolto Respimat daily.    CAD s/p CABG x2: Patient following with Dr. Nas Simon with Cardiology. Patient taking Aspirin and Lipitor. Patient not able to take beta blocker due to low HR. He denies any chest pain.    BPH/Hx of Bladder Cancer: Patient following with Dr. Demarco Munoz with Urology. Patient had metastases to the kidney and treated with surgery and chemo in 2012. He is taking Finasteride and Flomax. He denies any hematuria.     CKD III: Patient following with Dr. Brooklyn Muse with Nephrology.    GERD: Patient taking Pepcid PRN.     Secondary Polycythemia: Patient evaluated Dr. Theresa Guardado with Hematology-Oncology. JAK2 negative and Epo level negative. Polycythemia most likely reactive to COPD.    Review of Systems   Constitutional:  Negative for activity change, appetite change, chills, diaphoresis, fatigue, fever and unexpected weight change.   HENT:  Positive for hearing loss.    Eyes:  Negative for visual disturbance.   Respiratory:  Positive for cough and shortness of breath. Negative for apnea.    Cardiovascular:  Negative for chest pain, palpitations and leg swelling.   Gastrointestinal:  Negative for abdominal pain, blood in stool, constipation, diarrhea, nausea and vomiting.   Genitourinary:  Negative for dysuria and hematuria.   Musculoskeletal:  Negative for arthralgias.   Skin:  Negative for rash and wound.   Allergic/Immunologic: Positive for environmental allergies.   Neurological:  Negative for dizziness, syncope, weakness, numbness and headaches.   Psychiatric/Behavioral:  Negative for behavioral problems, dysphoric mood and sleep disturbance. The patient  "is not nervous/anxious.      Objective:   BP 97/66 (BP Location: Right arm)   Pulse 71   Temp 97.8 °F (36.6 °C) (Temporal)   Resp 16   Ht 5' 8" (1.727 m)   Wt 73.5 kg (162 lb 1.6 oz)   SpO2 (!) 93%   BMI 24.65 kg/m²     Physical Exam  Vitals and nursing note reviewed.   Constitutional:       General: He is not in acute distress.     Appearance: Normal appearance. He is not ill-appearing or toxic-appearing.   HENT:      Head: Normocephalic and atraumatic.      Right Ear: External ear normal. There is impacted cerumen.      Left Ear: External ear normal. There is impacted cerumen.      Mouth/Throat:      Mouth: Mucous membranes are moist.      Pharynx: Oropharynx is clear. No oropharyngeal exudate or posterior oropharyngeal erythema.   Eyes:      Conjunctiva/sclera: Conjunctivae normal.   Cardiovascular:      Rate and Rhythm: Normal rate and regular rhythm.      Heart sounds: Normal heart sounds. No murmur heard.  Pulmonary:      Effort: Pulmonary effort is normal. No respiratory distress.      Breath sounds: Normal breath sounds. No wheezing.   Abdominal:      General: Bowel sounds are normal. There is no distension.      Palpations: Abdomen is soft.      Tenderness: There is no abdominal tenderness.   Musculoskeletal:         General: No deformity. Normal range of motion.      Cervical back: Normal range of motion and neck supple. No rigidity.      Right lower leg: No edema.      Left lower leg: No edema.   Lymphadenopathy:      Cervical: No cervical adenopathy.   Skin:     General: Skin is warm and dry.      Findings: No lesion or rash.   Neurological:      General: No focal deficit present.      Mental Status: He is alert. Mental status is at baseline.      Motor: Weakness (generalized) present.      Coordination: Coordination normal.   Psychiatric:         Mood and Affect: Mood normal.         Behavior: Behavior normal.         Thought Content: Thought content normal.         Judgment: Judgment normal. "       Assessment/Plan:   1. Medicare annual wellness visit, subsequent  Comments:  - Health maintenance reviewed.    2. Hyperglycemia  -     Hemoglobin A1C; Future; Expected date: 07/09/2024    3. Chronic obstructive pulmonary disease, unspecified COPD type  Assessment & Plan:  - Stable.  - Patient following with Pulmonology.  - Pulmonology office visit from 01/23/24 reviewed.  - He had COVID in 11/2020 and stopped smoking since that time.  - He states compliance with his Stiolto Respimat daily.      4. Pulmonary nodule, right  Assessment & Plan:  - CT scans/surveillance per Pulmonology.      5. Coronary artery disease, unspecified vessel or lesion type, unspecified whether angina present, unspecified whether native or transplanted heart  Assessment & Plan:  - Stable.  - Patient following with Dr. Nas Simon with Cardiology.  - Patient taking Aspirin and Lipitor.  - Patient not able to take beta blocker due to low HR.      6. Benign prostatic hyperplasia, unspecified whether lower urinary tract symptoms present  Assessment & Plan:  - Stable.  - Patient following with Dr. Demarco Munoz with Urology.  - He is taking Finasteride and Flomax.      7. History of bladder cancer  Assessment & Plan:  - Patient had metastases to the kidney and treated with surgery and chemo in 2012.       8. Stage 3 chronic kidney disease, unspecified whether stage 3a or 3b CKD  Assessment & Plan:  - Stable.  - Patient following with Dr. Brooklyn Muse with Nephrology.   - Nephrology office visit from 10/19/23 reviewed.       9. Chronic kidney disease, stage 3a  Assessment & Plan:  - Stable.  - Patient following with Dr. Brooklyn Muse with Nephrology.   - Nephrology office visit from 10/19/23 reviewed.       10. Gastroesophageal reflux disease, unspecified whether esophagitis present  Assessment & Plan:  - Stable.  - Continue Pepcid PRN.      11. Allergic rhinitis, unspecified seasonality, unspecified trigger  Assessment & Plan:  -  Stable.  - Continue Flonase and Zyrtec.      12. Bilateral hearing loss, unspecified hearing loss type  -     Ambulatory referral/consult to ENT; Future; Expected date: 07/16/2024  -     Ambulatory referral/consult to Audiology; Future; Expected date: 07/16/2024         Opioid Screening: Patient medication list reviewed, patient is not taking prescription opioids. Patient is not using additional opioids than prescribed. Patient is at low risk of substance abuse based on this opioid use history.     Patient Reported Health Risk Assessment  What [age] 70-80  Are you male or female?: Male  During the past four weeks, how much have you been bothered by emotional problems such as feeling anxious, depressed, irritable, sad, or downhearted and blue?: Not at all  During the past five weeks, has your physical and/or emotional health limited your social activities with family, friends, neighbors, or groups?: Not at all  During the past four weeks, how much bodily pain have you generally had?: Moderate pain  During the past four weeks, was someone available to help if you needed and wanted help?: Yes, as much as I wanted  During the past four weeks, what was the hardest physical activity you could do for at least two minutes?: Moderate  Can you get to places out of walking distance without help?  (For example, can you travel alone on buses or taxis, or drive your own car?): Yes  Can you go shopping for groceries or clothes without someone's help?: Yes  Can you prepare your own meals?: Yes  Can you do your own housework without help?: Yes  Because of any health problems, do you need the help of another person with your personal care needs such as eating, bathing, dressing, or getting around the house?: No  Can you handle your own money without help?: Yes  During the past four weeks, how would you rate your health in general?: Very good  How have things been going for you during the past four weeks?: Pretty well  Are you  having difficulties driving your car?: No  Do you always fasten your seat belt when you are in a car?: Yes, usually  How often in the past four weeks have you been bothered by falling or dizzy when standing up?: Seldom  How often in the past four weeks have you been bothered by sexual problems?: Never  How often in the past four weeks have you been bothered by trouble eating well?: Never  How often in the past four weeks have you been bothered by teeth or denture problems?: Never  How often in the past four weeks have you been bothered with problems using the telephone?: Never  How often in the past four weeks have you been bothered by tiredness or fatigue?: Never  Have you fallen two or more times in the past year?: No  Are you afraid of falling?: No  Are you a smoker?: No  During the past four weeks, how many drinks of wine, beer, or other alcoholic beverages did you have?: One drink or less per week  Do you exercise for about 20 minutes three or more days a week?: Yes, some of the time  Have you been given any information to help you with hazards in your house that might hurt you?: Yes  Have you been given any information to help you with keeping track of your medications?: Yes  How often do you have trouble taking medicines the way you've been told to take them?: I always take them as prescribed  How confident are you that you can control and manage most of your health problems?: Very confident  What is your race? (Check all that apply.):     Medicare Annual Wellness and Personalized Prevention Plan:   Fall Risk + Home Safety + Hearing Impairment + Depression Screen + Opioid and Substance Abuse Screening + Cognitive Impairment Screen + Health Risk Assessment all reviewed.     Advance Care Planning   I attest to discussing Advance Care Planning with patient and/or family member.  Education was provided including the importance of the Health Care Power of , Advance Directives, and/or [place]  documentation.  The patient expressed understanding to the importance of this information and discussion.       Follow up in about 1 year (around 7/9/2025) for Medicare Wellness.

## 2024-07-09 NOTE — ASSESSMENT & PLAN NOTE
- Stable.  - Patient following with Pulmonology.  - Pulmonology office visit from 01/23/24 reviewed.  - He had COVID in 11/2020 and stopped smoking since that time.  - He states compliance with his Stiolto Respimat daily.

## 2024-07-09 NOTE — ASSESSMENT & PLAN NOTE
- Stable.  - Patient following with Dr. Brooklyn Muse with Nephrology.   - Nephrology office visit from 10/19/23 reviewed.

## 2024-07-09 NOTE — ASSESSMENT & PLAN NOTE
- Stable.  - Patient following with Dr. Nas Simon with Cardiology.  - Patient taking Aspirin and Lipitor.  - Patient not able to take beta blocker due to low HR.

## 2024-07-22 ENCOUNTER — TELEPHONE (OUTPATIENT)
Dept: FAMILY MEDICINE | Facility: CLINIC | Age: 79
End: 2024-07-22
Payer: MEDICARE

## 2024-07-22 NOTE — TELEPHONE ENCOUNTER
----- Message from Vikki Arguello sent at 7/22/2024 12:20 PM CDT -----  Who Called: Edwin Raman    Caller is requesting information on test results.    Name of Test (Lab/Mammo/Etc):  lipid panel  Date of Test:  unk  Where the test was performed:  unk  Ordering Provider: holly    Preferred Method of Contact: Phone Call  Patient's Preferred Phone Number on File: [phone]   Best Call Back Number, if different:  Additional Information:  therese , michelle [phone]) called to request most  recent lipid panel, please advise, thanks

## 2024-07-22 NOTE — TELEPHONE ENCOUNTER
Attempted to notify patient that we do not have a current lipid panel for him to fax to Dr. Ilan Simon of cardiology.    Patient last lipid dates back to over a year ago.

## 2024-10-24 ENCOUNTER — OFFICE VISIT (OUTPATIENT)
Dept: NEPHROLOGY | Facility: CLINIC | Age: 79
End: 2024-10-24
Payer: MEDICARE

## 2024-10-24 ENCOUNTER — LAB VISIT (OUTPATIENT)
Dept: LAB | Facility: HOSPITAL | Age: 79
End: 2024-10-24
Payer: MEDICARE

## 2024-10-24 VITALS
SYSTOLIC BLOOD PRESSURE: 150 MMHG | TEMPERATURE: 98 F | HEART RATE: 63 BPM | BODY MASS INDEX: 24.22 KG/M2 | RESPIRATION RATE: 20 BRPM | OXYGEN SATURATION: 93 % | WEIGHT: 159.81 LBS | HEIGHT: 68 IN | DIASTOLIC BLOOD PRESSURE: 84 MMHG

## 2024-10-24 DIAGNOSIS — N25.81 SECONDARY HYPERPARATHYROIDISM OF RENAL ORIGIN: ICD-10-CM

## 2024-10-24 DIAGNOSIS — N18.31 STAGE 3A CHRONIC KIDNEY DISEASE: Primary | ICD-10-CM

## 2024-10-24 DIAGNOSIS — N18.30 STAGE 3 CHRONIC KIDNEY DISEASE, UNSPECIFIED WHETHER STAGE 3A OR 3B CKD: ICD-10-CM

## 2024-10-24 DIAGNOSIS — I10 PRIMARY HYPERTENSION: ICD-10-CM

## 2024-10-24 LAB
ALBUMIN SERPL-MCNC: 4 G/DL (ref 3.4–4.8)
ALBUMIN/GLOB SERPL: 1.1 RATIO (ref 1.1–2)
ALP SERPL-CCNC: 111 UNIT/L (ref 40–150)
ALT SERPL-CCNC: 14 UNIT/L (ref 0–55)
ANION GAP SERPL CALC-SCNC: 8 MEQ/L
AST SERPL-CCNC: 21 UNIT/L (ref 5–34)
BASOPHILS # BLD AUTO: 0.07 X10(3)/MCL
BASOPHILS NFR BLD AUTO: 0.6 %
BILIRUB SERPL-MCNC: 0.7 MG/DL
BUN SERPL-MCNC: 19.5 MG/DL (ref 8.4–25.7)
CALCIUM SERPL-MCNC: 9.4 MG/DL (ref 8.8–10)
CHLORIDE SERPL-SCNC: 108 MMOL/L (ref 98–107)
CO2 SERPL-SCNC: 26 MMOL/L (ref 23–31)
CREAT SERPL-MCNC: 1.5 MG/DL (ref 0.72–1.25)
CREAT/UREA NIT SERPL: 13
EOSINOPHIL # BLD AUTO: 0.41 X10(3)/MCL (ref 0–0.9)
EOSINOPHIL NFR BLD AUTO: 3.6 %
ERYTHROCYTE [DISTWIDTH] IN BLOOD BY AUTOMATED COUNT: 13.2 % (ref 11.5–17)
GFR SERPLBLD CREATININE-BSD FMLA CKD-EPI: 47 ML/MIN/1.73/M2
GLOBULIN SER-MCNC: 3.6 GM/DL (ref 2.4–3.5)
GLUCOSE SERPL-MCNC: 93 MG/DL (ref 82–115)
HCT VFR BLD AUTO: 49.8 % (ref 42–52)
HGB BLD-MCNC: 16.3 G/DL (ref 14–18)
IMM GRANULOCYTES # BLD AUTO: 0.05 X10(3)/MCL (ref 0–0.04)
IMM GRANULOCYTES NFR BLD AUTO: 0.4 %
LYMPHOCYTES # BLD AUTO: 3.69 X10(3)/MCL (ref 0.6–4.6)
LYMPHOCYTES NFR BLD AUTO: 32.7 %
MCH RBC QN AUTO: 31 PG (ref 27–31)
MCHC RBC AUTO-ENTMCNC: 32.7 G/DL (ref 33–36)
MCV RBC AUTO: 94.7 FL (ref 80–94)
MONOCYTES # BLD AUTO: 0.81 X10(3)/MCL (ref 0.1–1.3)
MONOCYTES NFR BLD AUTO: 7.2 %
NEUTROPHILS # BLD AUTO: 6.27 X10(3)/MCL (ref 2.1–9.2)
NEUTROPHILS NFR BLD AUTO: 55.5 %
NRBC BLD AUTO-RTO: 0 %
PHOSPHATE SERPL-MCNC: 3.3 MG/DL (ref 2.3–4.7)
PLATELET # BLD AUTO: 238 X10(3)/MCL (ref 130–400)
PMV BLD AUTO: 9.2 FL (ref 7.4–10.4)
POTASSIUM SERPL-SCNC: 4.6 MMOL/L (ref 3.5–5.1)
PROT SERPL-MCNC: 7.6 GM/DL (ref 5.8–7.6)
PTH-INTACT SERPL-MCNC: 94.5 PG/ML (ref 8.7–77)
RBC # BLD AUTO: 5.26 X10(6)/MCL (ref 4.7–6.1)
SODIUM SERPL-SCNC: 142 MMOL/L (ref 136–145)
WBC # BLD AUTO: 11.3 X10(3)/MCL (ref 4.5–11.5)

## 2024-10-24 PROCEDURE — 84100 ASSAY OF PHOSPHORUS: CPT

## 2024-10-24 PROCEDURE — 36415 COLL VENOUS BLD VENIPUNCTURE: CPT

## 2024-10-24 PROCEDURE — 83970 ASSAY OF PARATHORMONE: CPT

## 2024-10-24 PROCEDURE — 99215 OFFICE O/P EST HI 40 MIN: CPT | Mod: PBBFAC

## 2024-10-24 PROCEDURE — 99999 PR PBB SHADOW E&M-EST. PATIENT-LVL V: CPT | Mod: PBBFAC,,,

## 2024-10-24 PROCEDURE — 85025 COMPLETE CBC W/AUTO DIFF WBC: CPT

## 2024-10-24 PROCEDURE — 80053 COMPREHEN METABOLIC PANEL: CPT

## 2024-10-24 RX ORDER — TRIAMCINOLONE ACETONIDE 55 UG/1
2 SPRAY, METERED NASAL DAILY
COMMUNITY

## 2024-10-24 NOTE — PROGRESS NOTES
[facility] Ambulatory Progress Note      HPI  Edwin Raman, 79 y.o. 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  Patient feels fine denies any major complaints.  Admits to not drinking much water.  Drinks a cup of coffee and a Coke a day.    Patient denies taking NSAIDs or new antibiotics. Also denies recent episode of dehydration, diarrhea, vomiting, acute illness, hospitalization, recent angiograms or exposure to IV radiocontrast.        Medical Diagnoses:   Past Medical History:   Diagnosis Date    Advanced COPD     Arthritis     Bladder cancer     BPH (benign prostatic hyperplasia)     CAD (coronary artery disease)     GERD (gastroesophageal reflux disease)     HLD (hyperlipidemia)     Hypocalcemia     Kidney cancer, primary, with metastasis from kidney to other site      Patient Active Problem List   Diagnosis    COPD (chronic obstructive pulmonary disease)    Pulmonary nodule, right    CAD (coronary artery disease)    BPH (benign prostatic hyperplasia)    History of bladder cancer    GERD (gastroesophageal reflux disease)    Stage 3a chronic kidney disease    Allergic rhinitis    Secondary hyperparathyroidism of renal origin    Primary hypertension       Surgical History:   Past Surgical History:   Procedure Laterality Date    CORONARY ARTERY BYPASS GRAFT  2009    heart surgery      Double bypass    KIDNEY SURGERY         Family History:   Family History   Problem Relation Name Age of Onset    Heart attack Father iMnh Raman     Alzheimer's disease Father Minh Raman     Heart disease Father Minh Raman        Social History:   Social History     Tobacco Use    Smoking status: Former     Current packs/day: 0.00     Average packs/day: 1 pack/day for 57.0 years (57.0 ttl pk-yrs)     Types: Cigarettes     Start date: 1963     Quit date: 2020     Years since quittin.8     Passive exposure: Past    Smokeless tobacco: Never    Substance Use Topics    Alcohol use: Yes     Comment: very rare       Allergies:  Review of patient's allergies indicates:   Allergen Reactions    Ciprofloxacin Other (See Comments)       Medications:    Current Outpatient Medications:     albuterol (PROVENTIL/VENTOLIN HFA) 90 mcg/actuation inhaler, Inhale 2 puffs into the lungs every 6 (six) hours as needed for Wheezing., Disp: 18 g, Rfl: 1    ascorbic acid, vitamin C, (VITAMIN C) 1000 MG tablet, Take 1,000 mg by mouth once daily., Disp: , Rfl:     aspirin (ECOTRIN) 81 MG EC tablet, Take 81 mg by mouth once daily., Disp: , Rfl:     atorvastatin (LIPITOR) 20 MG tablet, Take 20 mg by mouth once daily., Disp: , Rfl:     b complex vitamins capsule, Take 1 capsule by mouth once daily., Disp: , Rfl:     cetirizine (ZYRTEC) 10 MG tablet, Take 10 mg by mouth once daily., Disp: , Rfl:     famotidine (PEPCID) 40 MG tablet, Take 40 mg by mouth as needed for Heartburn., Disp: , Rfl:     finasteride (PROSCAR) 5 mg tablet, Take 5 mg by mouth once daily., Disp: , Rfl:     multivitamin with minerals tablet, Take 1 tablet by mouth once daily., Disp: , Rfl:     omega-3 fatty acids/fish oil (FISH OIL-OMEGA-3 FATTY ACIDS) 300-1,000 mg capsule, Take 2 capsules by mouth once daily., Disp: , Rfl:     OXYGEN-AIR DELIVERY SYSTEMS MISC, 2 L by Misc.(Non-Drug; Combo Route) route as needed., Disp: , Rfl:     STIOLTO RESPIMAT 2.5-2.5 mcg/actuation Mist, Inhale 2 puffs into the lungs once daily., Disp: 4 g, Rfl: 11    tamsulosin (FLOMAX) 0.4 mg Cap, Take 1 capsule by mouth once daily., Disp: , Rfl:     triamcinolone (NASACORT) 55 mcg nasal inhaler, 2 sprays by Nasal route once daily., Disp: , Rfl:     zinc gluconate 50 mg tablet, Take 50 mg by mouth once daily., Disp: , Rfl:     fluticasone propionate (FLONASE) 50 mcg/actuation nasal spray, 1 spray by Each Nostril route once daily. (Patient not taking: Reported on 10/24/2024), Disp: , Rfl:        Review of Systems:    Constitutional: Denies  "fever, fatigue, generalized weakness  Skin: Denies wounds, no rashes, no itching, no new skin lesions  Respiratory:  Denies cough, shortness of breath, or wheezing  Cardiovascular: Denies chest pain, palpitations, or swelling  Gastrointestional: Denies abdominal pain, nausea, vomiting, diarrhea, or constipation  Genitourinary: Denies dysuria, hematuria, foamy urine, or incontinence; reports able to empty bladder  Musculoskeletal: Denies back or flank pain  Neurological: Denies headaches, dizziness, paresthesias, tremors or focal weakness      Vital Signs:  BP (!) 150/84 (BP Location: Right arm, Patient Position: Sitting)   Pulse 63   Temp 97.7 °F (36.5 °C) (Oral)   Resp 20   Ht 5' 8" (1.727 m)   Wt 72.5 kg (159 lb 12.8 oz)   SpO2 (!) 93%   BMI 24.30 kg/m²   Body mass index is 24.3 kg/m².      Physical Exam:    General: no acute distress, awake, alert  Eyes: conjunctiva clear, eyelids without swelling  HENT: atraumatic, oropharynx and nasal mucosa patent  Neck: supple, trache midline, no JVD  Respiratory: equal, unlabored, clear to auscultation A/P  Cardiovascular: RRR without murmur or rub  Edema: none  Gastrointestinal: soft, non-tender, non-distended  Musculoskeletal: ROM without new limitation or discomfort  Integumentary: warm, dry; no rashes, wounds, or skin lesions  Neurological: oriented x4, appropriate, no acute deficits; no asterixis      Labs:        Component Value Date/Time     10/24/2024 0811     10/16/2023 1447    K 4.6 10/24/2024 0811    K 4.3 10/16/2023 1447     (H) 10/24/2024 0811     10/16/2023 1447    CO2 26 10/24/2024 0811    CO2 26 10/16/2023 1447    BUN 19.5 10/24/2024 0811    BUN 19.3 10/16/2023 1447    CREATININE 1.50 (H) 10/24/2024 0811    CREATININE 1.26 (H) 10/16/2023 1447    CREATININE 1.25 (H) 09/29/2022 1254    CREATININE 1.34 04/12/2022 0955    CALCIUM 9.4 10/24/2024 0811    CALCIUM 8.9 10/16/2023 1447    PHOS 3.3 10/24/2024 0811    PHOS 3.0 10/16/2023 " 1447    PTH 94.5 (H) 10/24/2024 0811    PTH 97.1 (H) 10/16/2023 1447    PTH 53.1 09/29/2022 1254           Component Value Date/Time    WBC 11.30 10/24/2024 0811    WBC 10.50 10/16/2023 1447    HGB 16.3 10/24/2024 0811    HGB 15.1 10/16/2023 1447    HCT 49.8 10/24/2024 0811    HCT 46.6 10/16/2023 1447     10/24/2024 0811     10/16/2023 1447       Urine Creatinine   Date Value Ref Range Status   10/16/2023 197.3 (H) 63.0 - 166.0 mg/dL Final   09/29/2022 174.5 (H) 63.0 - 166.0 mg/dL Final       Urine Protein Level   Date Value Ref Range Status   10/16/2023 11.0 mg/dL Final   09/29/2022 8.8 mg/dL Final         Impression:    1. Stage 3a chronic kidney disease        2. Primary hypertension        3. 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  Renal function with deterioration since last year  No new medications  Patient admits to not drinking much water at all  Blood pressure has been well controlled at home.  Slightly elevated in office today.  No significant proteinuria  Monitor elevated PTH  He does have history of nephrolithiasis with potential plans for lithotripsy in a couple of months with urologist    Plan:  Increase water intake  Follow up in 6 months    Franky KELSEY      This note was created with the assistance of [employer] voice recognition software or phone dictation. There may be transcription errors as a result of using this technology however minimal. Effort has been made to assure accuracy of transcription but any obvious errors or omissions should be clarified with the author of the document.

## 2024-10-24 NOTE — PATIENT INSTRUCTIONS
Increase water intake to about 4 bottles a day    Blood pressure goal: consistently less than 130/85      Avoid NSAIDs and COX2 inhibitors: Advil (ibuprofen), Aleve (naproxen), Mobic (meloxicam), Celebrex (celecoxib), Toradol (ketorolac) and Diclofenac (voltaren), Indomethacin (indocin).  Only take tylenol (acetaminophen) occasionally if needed for aches/pains.    Recommend low sodium diet:  Less than 2,000 mg per day  Avoid high salt foods (olives, pickles, smoked meats, deli meats, salted potato chips, fast food, etc.).   Do not add salt to your food at the table.   Use only small amounts of salt when cooking.      Avoid alcohol and soda. Limit tea and coffee.     Stay well hydrated with water    Call our office with concerns prior to next appointment    For more education on kidney disease you can visit:  [email or web address]

## 2024-12-04 ENCOUNTER — PATIENT MESSAGE (OUTPATIENT)
Facility: CLINIC | Age: 79
End: 2024-12-04
Payer: MEDICARE

## 2025-04-22 ENCOUNTER — LAB VISIT (OUTPATIENT)
Dept: LAB | Facility: HOSPITAL | Age: 80
End: 2025-04-22
Payer: MEDICARE

## 2025-04-22 DIAGNOSIS — I10 PRIMARY HYPERTENSION: ICD-10-CM

## 2025-04-22 DIAGNOSIS — N18.31 STAGE 3A CHRONIC KIDNEY DISEASE: ICD-10-CM

## 2025-04-22 DIAGNOSIS — N25.81 SECONDARY HYPERPARATHYROIDISM OF RENAL ORIGIN: ICD-10-CM

## 2025-04-22 LAB
ALBUMIN SERPL-MCNC: 3.4 G/DL (ref 3.4–4.8)
ALBUMIN/GLOB SERPL: 0.9 RATIO (ref 1.1–2)
ALP SERPL-CCNC: 95 UNIT/L (ref 40–150)
ALT SERPL-CCNC: 12 UNIT/L (ref 0–55)
ANION GAP SERPL CALC-SCNC: 6 MEQ/L
AST SERPL-CCNC: 17 UNIT/L (ref 11–45)
BACTERIA #/AREA URNS AUTO: ABNORMAL /HPF
BASOPHILS # BLD AUTO: 0.05 X10(3)/MCL
BASOPHILS NFR BLD AUTO: 0.6 %
BILIRUB SERPL-MCNC: 0.5 MG/DL
BILIRUB UR QL STRIP.AUTO: NEGATIVE
BUN SERPL-MCNC: 23.9 MG/DL (ref 8.4–25.7)
CALCIUM SERPL-MCNC: 8.7 MG/DL (ref 8.8–10)
CHLORIDE SERPL-SCNC: 109 MMOL/L (ref 98–107)
CLARITY UR: CLEAR
CO2 SERPL-SCNC: 25 MMOL/L (ref 23–31)
COLOR UR AUTO: YELLOW
CREAT SERPL-MCNC: 1.36 MG/DL (ref 0.72–1.25)
CREAT UR-MCNC: 179.4 MG/DL (ref 63–166)
CREAT/UREA NIT SERPL: 18
EOSINOPHIL # BLD AUTO: 0.46 X10(3)/MCL (ref 0–0.9)
EOSINOPHIL NFR BLD AUTO: 5.2 %
ERYTHROCYTE [DISTWIDTH] IN BLOOD BY AUTOMATED COUNT: 13.2 % (ref 11.5–17)
GFR SERPLBLD CREATININE-BSD FMLA CKD-EPI: 53 ML/MIN/1.73/M2
GLOBULIN SER-MCNC: 3.6 GM/DL (ref 2.4–3.5)
GLUCOSE SERPL-MCNC: 104 MG/DL (ref 82–115)
GLUCOSE UR QL STRIP: NORMAL
HCT VFR BLD AUTO: 45.3 % (ref 42–52)
HGB BLD-MCNC: 14.4 G/DL (ref 14–18)
HGB UR QL STRIP: NEGATIVE
IMM GRANULOCYTES # BLD AUTO: 0.03 X10(3)/MCL (ref 0–0.04)
IMM GRANULOCYTES NFR BLD AUTO: 0.3 %
KETONES UR QL STRIP: NEGATIVE
LEUKOCYTE ESTERASE UR QL STRIP: NEGATIVE
LYMPHOCYTES # BLD AUTO: 2.72 X10(3)/MCL (ref 0.6–4.6)
LYMPHOCYTES NFR BLD AUTO: 30.8 %
MCH RBC QN AUTO: 30.6 PG (ref 27–31)
MCHC RBC AUTO-ENTMCNC: 31.8 G/DL (ref 33–36)
MCV RBC AUTO: 96.4 FL (ref 80–94)
MONOCYTES # BLD AUTO: 0.79 X10(3)/MCL (ref 0.1–1.3)
MONOCYTES NFR BLD AUTO: 8.9 %
MUCOUS THREADS URNS QL MICRO: ABNORMAL /LPF
NEUTROPHILS # BLD AUTO: 4.78 X10(3)/MCL (ref 2.1–9.2)
NEUTROPHILS NFR BLD AUTO: 54.2 %
NITRITE UR QL STRIP: NEGATIVE
NRBC BLD AUTO-RTO: 0 %
PH UR STRIP: 5.5 [PH]
PLATELET # BLD AUTO: 198 X10(3)/MCL (ref 130–400)
PMV BLD AUTO: 8.9 FL (ref 7.4–10.4)
POTASSIUM SERPL-SCNC: 4.9 MMOL/L (ref 3.5–5.1)
PROT SERPL-MCNC: 7 GM/DL (ref 5.8–7.6)
PROT UR QL STRIP: NEGATIVE
PROT UR STRIP-MCNC: 7.3 MG/DL
PTH-INTACT SERPL-MCNC: 108.3 PG/ML (ref 8.7–77)
RBC # BLD AUTO: 4.7 X10(6)/MCL (ref 4.7–6.1)
RBC #/AREA URNS AUTO: ABNORMAL /HPF
SODIUM SERPL-SCNC: 140 MMOL/L (ref 136–145)
SP GR UR STRIP.AUTO: 1.02 (ref 1–1.03)
SQUAMOUS #/AREA URNS LPF: ABNORMAL /HPF
URINE PROTEIN/CREATININE RATIO (OLG): 0
UROBILINOGEN UR STRIP-ACNC: NORMAL
WBC # BLD AUTO: 8.83 X10(3)/MCL (ref 4.5–11.5)
WBC #/AREA URNS AUTO: ABNORMAL /HPF

## 2025-04-22 PROCEDURE — 82570 ASSAY OF URINE CREATININE: CPT

## 2025-04-22 PROCEDURE — 36415 COLL VENOUS BLD VENIPUNCTURE: CPT

## 2025-04-22 PROCEDURE — 83970 ASSAY OF PARATHORMONE: CPT

## 2025-04-22 PROCEDURE — 80053 COMPREHEN METABOLIC PANEL: CPT

## 2025-04-22 PROCEDURE — 81001 URINALYSIS AUTO W/SCOPE: CPT

## 2025-04-22 PROCEDURE — 85025 COMPLETE CBC W/AUTO DIFF WBC: CPT

## 2025-04-28 ENCOUNTER — OFFICE VISIT (OUTPATIENT)
Dept: NEPHROLOGY | Facility: CLINIC | Age: 80
End: 2025-04-28
Payer: MEDICARE

## 2025-04-28 VITALS
WEIGHT: 164.19 LBS | HEART RATE: 66 BPM | SYSTOLIC BLOOD PRESSURE: 133 MMHG | DIASTOLIC BLOOD PRESSURE: 77 MMHG | HEIGHT: 68 IN | OXYGEN SATURATION: 95 % | TEMPERATURE: 98 F | RESPIRATION RATE: 18 BRPM | BODY MASS INDEX: 24.89 KG/M2

## 2025-04-28 DIAGNOSIS — N25.81 SECONDARY HYPERPARATHYROIDISM OF RENAL ORIGIN: ICD-10-CM

## 2025-04-28 DIAGNOSIS — I10 PRIMARY HYPERTENSION: ICD-10-CM

## 2025-04-28 DIAGNOSIS — N28.1 RENAL CYST: ICD-10-CM

## 2025-04-28 DIAGNOSIS — N18.31 STAGE 3A CHRONIC KIDNEY DISEASE: Primary | ICD-10-CM

## 2025-04-28 PROCEDURE — 99214 OFFICE O/P EST MOD 30 MIN: CPT | Mod: PBBFAC

## 2025-04-28 PROCEDURE — 99999 PR PBB SHADOW E&M-EST. PATIENT-LVL IV: CPT | Mod: PBBFAC,,,

## 2025-04-28 NOTE — PROGRESS NOTES
[facility] Progress Note      HPI  Edwin Raman, 79 y.o. 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  Patient feels fine denies any major complaints.  Admits to not drinking much water.  No changes since last visit.    Patient denies taking NSAIDs or new antibiotics. Also denies recent episode of dehydration, diarrhea, vomiting, acute illness, hospitalization, recent angiograms or exposure to IV radiocontrast.        Medical Diagnoses:   Past Medical History:   Diagnosis Date    Advanced COPD     Arthritis     Bladder cancer     BPH (benign prostatic hyperplasia)     CAD (coronary artery disease)     GERD (gastroesophageal reflux disease)     HLD (hyperlipidemia)     Hypocalcemia     Kidney cancer, primary, with metastasis from kidney to other site      Patient Active Problem List   Diagnosis    COPD (chronic obstructive pulmonary disease)    Pulmonary nodule, right    CAD (coronary artery disease)    BPH (benign prostatic hyperplasia)    History of bladder cancer    GERD (gastroesophageal reflux disease)    Stage 3a chronic kidney disease    Allergic rhinitis    Secondary hyperparathyroidism of renal origin    Primary hypertension       Surgical History:   Past Surgical History:   Procedure Laterality Date    CORONARY ARTERY BYPASS GRAFT  2009    heart surgery      Double bypass    KIDNEY SURGERY         Family History:   Family History   Problem Relation Name Age of Onset    Heart attack Father Minh Raman     Alzheimer's disease Father Minh Raman     Heart disease Father Minh Raman        Social History:   Social History     Tobacco Use    Smoking status: Former     Current packs/day: 0.00     Average packs/day: 1 pack/day for 57.0 years (57.0 ttl pk-yrs)     Types: Cigarettes     Start date: 1963     Quit date: 2020     Years since quittin.3     Passive exposure: Past    Smokeless tobacco: Never   Substance Use  Topics    Alcohol use: Yes     Comment: very rare       Allergies:  Review of patient's allergies indicates:   Allergen Reactions    Ciprofloxacin Other (See Comments)       Medications:    Current Outpatient Medications:     albuterol (PROVENTIL/VENTOLIN HFA) 90 mcg/actuation inhaler, Inhale 2 puffs into the lungs every 6 (six) hours as needed for Wheezing., Disp: 18 g, Rfl: 1    ascorbic acid, vitamin C, (VITAMIN C) 1000 MG tablet, Take 1,000 mg by mouth once daily., Disp: , Rfl:     aspirin (ECOTRIN) 81 MG EC tablet, Take 81 mg by mouth once daily., Disp: , Rfl:     atorvastatin (LIPITOR) 20 MG tablet, Take 20 mg by mouth once daily., Disp: , Rfl:     b complex vitamins capsule, Take 1 capsule by mouth once daily., Disp: , Rfl:     cetirizine (ZYRTEC) 10 MG tablet, Take 10 mg by mouth once daily., Disp: , Rfl:     famotidine (PEPCID) 40 MG tablet, Take 40 mg by mouth as needed for Heartburn., Disp: , Rfl:     finasteride (PROSCAR) 5 mg tablet, Take 5 mg by mouth once daily., Disp: , Rfl:     fluticasone propionate (FLONASE) 50 mcg/actuation nasal spray, 1 spray by Each Nostril route once daily., Disp: , Rfl:     multivitamin with minerals tablet, Take 1 tablet by mouth once daily., Disp: , Rfl:     omega-3 fatty acids/fish oil (FISH OIL-OMEGA-3 FATTY ACIDS) 300-1,000 mg capsule, Take 2 capsules by mouth once daily., Disp: , Rfl:     OXYGEN-AIR DELIVERY SYSTEMS MISC, 2 L by Misc.(Non-Drug; Combo Route) route as needed., Disp: , Rfl:     STIOLTO RESPIMAT 2.5-2.5 mcg/actuation Mist, Inhale 2 puffs into the lungs once daily., Disp: 4 g, Rfl: 11    tamsulosin (FLOMAX) 0.4 mg Cap, Take 1 capsule by mouth once daily., Disp: , Rfl:     triamcinolone (NASACORT) 55 mcg nasal inhaler, 2 sprays by Nasal route once daily., Disp: , Rfl:     zinc gluconate 50 mg tablet, Take 50 mg by mouth once daily., Disp: , Rfl:        Review of Systems:    Constitutional: Denies fever, fatigue, generalized weakness  Skin: Denies wounds, no  "rashes, no itching, no new skin lesions  Respiratory:  Denies cough, shortness of breath, or wheezing  Cardiovascular: Denies chest pain, palpitations, or swelling  Gastrointestional: Denies abdominal pain, nausea, vomiting, diarrhea, or constipation  Genitourinary: Denies dysuria, hematuria, foamy urine, or incontinence; reports able to empty bladder  Musculoskeletal: Denies back or flank pain  Neurological: Denies headaches, dizziness, paresthesias, tremors or focal weakness      Vital Signs:  /77 (BP Location: Right arm, Patient Position: Sitting)   Pulse 66   Temp 97.9 °F (36.6 °C) (Oral)   Resp 18   Ht 5' 8" (1.727 m)   Wt 74.5 kg (164 lb 3.2 oz)   SpO2 95%   BMI 24.97 kg/m²   Body mass index is 24.97 kg/m².      Physical Exam:    General: no acute distress, awake, alert  Eyes: conjunctiva clear, eyelids without swelling  HENT: atraumatic, oropharynx and nasal mucosa patent  Neck: supple, trache midline, no JVD  Respiratory: equal, unlabored, clear to auscultation A/P  Cardiovascular: RRR without murmur or rub  Edema: none  Gastrointestinal: soft, non-tender, non-distended  Musculoskeletal: ROM without new limitation or discomfort  Integumentary: warm, dry; no rashes, wounds, or skin lesions  Neurological: oriented x4, appropriate, no acute deficits; no asterixis      Labs:        Component Value Date/Time     04/22/2025 1240     10/24/2024 0811    K 4.9 04/22/2025 1240    K 4.6 10/24/2024 0811     (H) 04/22/2025 1240     (H) 10/24/2024 0811    CO2 25 04/22/2025 1240    CO2 26 10/24/2024 0811    BUN 23.9 04/22/2025 1240    BUN 19.5 10/24/2024 0811    CREATININE 1.36 (H) 04/22/2025 1240    CREATININE 1.50 (H) 10/24/2024 0811    CREATININE 1.26 (H) 10/16/2023 1447    CREATININE 1.25 (H) 09/29/2022 1254    CALCIUM 8.7 (L) 04/22/2025 1240    CALCIUM 9.4 10/24/2024 0811    PHOS 3.3 10/24/2024 0811    PHOS 3.0 10/16/2023 1447    .3 (H) 04/22/2025 1240    PTH 94.5 (H) " 10/24/2024 0811    PTH 97.1 (H) 10/16/2023 1447           Component Value Date/Time    WBC 8.83 04/22/2025 1240    WBC 11.30 10/24/2024 0811    HGB 14.4 04/22/2025 1240    HGB 16.3 10/24/2024 0811    HCT 45.3 04/22/2025 1240    HCT 49.8 10/24/2024 0811     04/22/2025 1240     10/24/2024 0811       Urine Creatinine   Date Value Ref Range Status   04/22/2025 179.4 (H) 63.0 - 166.0 mg/dL Final   10/16/2023 197.3 (H) 63.0 - 166.0 mg/dL Final       Urine Protein Level   Date Value Ref Range Status   04/22/2025 7.3 mg/dL Final   10/16/2023 11.0 mg/dL Final     RENAL US 2022: cyst    Impression:    1. Primary hypertension        2. Stage 3a chronic kidney disease        3. 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 Renal function stable  Blood pressure at goal  No significant proteinuria  PTH <200; monitor trend      Plan:  Increase water intake    Renal US to monitor cyst within 1 month  He sees urologist but does not do US in office    Follow up in 6 months    Franky KELSEY      This note was created with the assistance of [employer] voice recognition software or phone dictation. There may be transcription errors as a result of using this technology however minimal. Effort has been made to assure accuracy of transcription but any obvious errors or omissions should be clarified with the author of the document.

## 2025-05-28 ENCOUNTER — HOSPITAL ENCOUNTER (OUTPATIENT)
Dept: RADIOLOGY | Facility: HOSPITAL | Age: 80
Discharge: HOME | End: 2025-05-28
Payer: MEDICARE

## 2025-05-28 DIAGNOSIS — N18.31 STAGE 3A CHRONIC KIDNEY DISEASE: ICD-10-CM

## 2025-05-28 DIAGNOSIS — N25.81 SECONDARY HYPERPARATHYROIDISM OF RENAL ORIGIN: ICD-10-CM

## 2025-05-28 DIAGNOSIS — N28.1 RENAL CYST: ICD-10-CM

## 2025-05-28 DIAGNOSIS — I10 PRIMARY HYPERTENSION: ICD-10-CM

## 2025-05-28 PROCEDURE — 76770 US EXAM ABDO BACK WALL COMP: CPT | Mod: TC

## 2025-07-10 ENCOUNTER — LAB VISIT (OUTPATIENT)
Dept: LAB | Facility: HOSPITAL | Age: 80
End: 2025-07-10
Attending: STUDENT IN AN ORGANIZED HEALTH CARE EDUCATION/TRAINING PROGRAM
Payer: MEDICARE

## 2025-07-10 ENCOUNTER — OFFICE VISIT (OUTPATIENT)
Dept: FAMILY MEDICINE | Facility: CLINIC | Age: 80
End: 2025-07-10
Payer: MEDICARE

## 2025-07-10 VITALS
RESPIRATION RATE: 18 BRPM | OXYGEN SATURATION: 96 % | DIASTOLIC BLOOD PRESSURE: 74 MMHG | BODY MASS INDEX: 24.8 KG/M2 | HEIGHT: 68 IN | HEART RATE: 63 BPM | WEIGHT: 163.63 LBS | SYSTOLIC BLOOD PRESSURE: 126 MMHG | TEMPERATURE: 97 F

## 2025-07-10 DIAGNOSIS — J44.9 CHRONIC OBSTRUCTIVE PULMONARY DISEASE, UNSPECIFIED COPD TYPE: ICD-10-CM

## 2025-07-10 DIAGNOSIS — N18.31 STAGE 3A CHRONIC KIDNEY DISEASE: ICD-10-CM

## 2025-07-10 DIAGNOSIS — R73.03 PREDIABETES: ICD-10-CM

## 2025-07-10 DIAGNOSIS — Z87.891 PERSONAL HISTORY OF NICOTINE DEPENDENCE: ICD-10-CM

## 2025-07-10 DIAGNOSIS — R91.1 PULMONARY NODULE, RIGHT: ICD-10-CM

## 2025-07-10 DIAGNOSIS — Z85.51 HISTORY OF BLADDER CANCER: ICD-10-CM

## 2025-07-10 DIAGNOSIS — Z00.00 MEDICARE ANNUAL WELLNESS VISIT, SUBSEQUENT: Primary | ICD-10-CM

## 2025-07-10 DIAGNOSIS — K21.9 GASTROESOPHAGEAL REFLUX DISEASE, UNSPECIFIED WHETHER ESOPHAGITIS PRESENT: ICD-10-CM

## 2025-07-10 DIAGNOSIS — D22.9 ATYPICAL NEVI: ICD-10-CM

## 2025-07-10 DIAGNOSIS — N40.0 BENIGN PROSTATIC HYPERPLASIA, UNSPECIFIED WHETHER LOWER URINARY TRACT SYMPTOMS PRESENT: ICD-10-CM

## 2025-07-10 DIAGNOSIS — Z12.2 SCREENING FOR LUNG CANCER: ICD-10-CM

## 2025-07-10 DIAGNOSIS — I25.10 CORONARY ARTERY DISEASE, UNSPECIFIED VESSEL OR LESION TYPE, UNSPECIFIED WHETHER ANGINA PRESENT, UNSPECIFIED WHETHER NATIVE OR TRANSPLANTED HEART: ICD-10-CM

## 2025-07-10 PROBLEM — I10 PRIMARY HYPERTENSION: Status: RESOLVED | Noted: 2024-10-24 | Resolved: 2025-07-10

## 2025-07-10 LAB
EST. AVERAGE GLUCOSE BLD GHB EST-MCNC: 119.8 MG/DL
HBA1C MFR BLD: 5.8 %

## 2025-07-10 PROCEDURE — 83036 HEMOGLOBIN GLYCOSYLATED A1C: CPT

## 2025-07-10 PROCEDURE — 36415 COLL VENOUS BLD VENIPUNCTURE: CPT

## 2025-07-10 NOTE — ASSESSMENT & PLAN NOTE
- Stable.  - Patient following with Pulmonology.  - He had COVID in 11/2020 and stopped smoking since that time.  - He states compliance with his Stiolto Respimat daily.

## 2025-07-10 NOTE — PROGRESS NOTES
Family Medicine      Patient ID: [id number]     Chief Complaint: Medicare Annual Wellness     HPI:     Edwin Raman is a 80 y.o.  male here today for a Medicare Annual Wellness visit and comprehensive Health Risk Assessment. He is accompanied by his wife, Mrs. Dana Raman.     COPD/Hx of Right Pulmonary Nodule: Patient following with Pulmonology. He had COVID in 11/2020 and stopped smoking since that time. He states compliance with his Stiolto Respimat daily. Patient amenable to LDCT for lung CA screening.    CAD s/p CABG x2: Patient following with Dr. Nas Simon with Cardiology. Patient taking Aspirin and Lipitor. Patient not able to take beta blocker due to low HR. He denies any chest pain.    BPH/Hx of Bladder Cancer: Patient following with Dr. Demarco Munoz with Urology. Patient had metastases to the kidney and treated with surgery and chemo in 2012. He is taking Finasteride and Flomax. He denies any hematuria.     CKDIII: Patient following with Dr. Brooklyn Muse with Nephrology.    GERD: Patient taking Pepcid PRN.     Secondary Polycythemia: Patient evaluated Dr. Theresa Guardado with Hematology-Oncology. JAK2 negative and Epo level negative. Polycythemia most likely reactive to COPD.    Health Maintenance         Date Due Completion Date    TETANUS VACCINE Never done ---    Pneumococcal Vaccines (Age 50+) (1 of 2 - PCV) Never done ---    Shingles Vaccine (1 of 2) Never done ---    RSV Vaccine (Age 60+ and Pregnant patients) (1 - 1-dose 75+ series) Never done ---    COVID-19 Vaccine (7 - 2024-25 season) 09/01/2024 12/7/2021    LDCT Lung Screen 01/15/2025 1/15/2024    Influenza Vaccine (1) 09/01/2025 ---    Aspirin/Antiplatelet Therapy 04/28/2026 4/28/2025    Lipid Panel 08/12/2029 8/12/2024             Past Medical History:   Diagnosis Date    Advanced COPD     Arthritis     Bladder cancer     BPH (benign prostatic hyperplasia)     CAD (coronary artery disease)     GERD (gastroesophageal  reflux disease)     HLD (hyperlipidemia)     Hypocalcemia     Kidney cancer, primary, with metastasis from kidney to other site 2018        Past Surgical History:   Procedure Laterality Date    CORONARY ARTERY BYPASS GRAFT  2009    heart surgery      Double bypass    KIDNEY SURGERY          Social History     Socioeconomic History    Marital status:    Occupational History    Occupation: retired   Tobacco Use    Smoking status: Former     Current packs/day: 0.00     Average packs/day: 1 pack/day for 57.0 years (57.0 ttl pk-yrs)     Types: Cigarettes     Start date: 1963     Quit date: 2020     Years since quittin.5     Passive exposure: Past    Smokeless tobacco: Never   Substance and Sexual Activity    Alcohol use: Not Currently    Drug use: Never    Sexual activity: Not Currently     Partners: Female     Birth control/protection: Abstinence     Social Drivers of Health     Financial Resource Strain: Low Risk  (2025)    Overall Financial Resource Strain (CARDIA)     Difficulty of Paying Living Expenses: Not hard at all   Food Insecurity: No Food Insecurity (2025)    Hunger Vital Sign     Worried About Running Out of Food in the Last Year: Never true     Ran Out of Food in the Last Year: Never true   Transportation Needs: No Transportation Needs (2025)    PRAPARE - Transportation     Lack of Transportation (Medical): No     Lack of Transportation (Non-Medical): No   Physical Activity: Unknown (2025)    Exercise Vital Sign     Days of Exercise per Week: 0 days   Stress: No Stress Concern Present (2025)    [employer] - Occupational Stress Questionnaire     Feeling of Stress : Not at all   Housing Stability: Low Risk  (2025)    Housing Stability Vital Sign     Unable to Pay for Housing in the Last Year: No     Number of Times Moved in the Last Year: 1     Homeless in the Last Year: No        Family History   Problem Relation Name  Age of Onset    Heart attack Father Minh Raman     Alzheimer's disease Father Minh Raman     Heart disease Father Minh Raman         Current Outpatient Medications   Medication Instructions    albuterol (PROVENTIL/VENTOLIN HFA) 90 mcg/actuation inhaler 2 puffs, Inhalation, Every 6 hours PRN    ascorbic acid (vitamin C) (VITAMIN C) 1,000 mg, Daily    aspirin (ECOTRIN) 81 mg, Daily    atorvastatin (LIPITOR) 20 mg, Daily    b complex vitamins capsule 1 capsule, Daily    cetirizine (ZYRTEC) 10 mg, Daily    famotidine (PEPCID) 40 mg, As needed (PRN)    finasteride (PROSCAR) 5 mg, Daily    fluticasone propionate (FLONASE) 50 mcg/actuation nasal spray 1 spray, Daily    multivitamin with minerals tablet 1 tablet, Daily    omega-3 fatty acids/fish oil (FISH OIL-OMEGA-3 FATTY ACIDS) 300-1,000 mg capsule 2 capsules, Daily    OXYGEN-AIR DELIVERY SYSTEMS MISC 2 L, As needed (PRN)    STIOLTO RESPIMAT 2.5-2.5 mcg/actuation Mist 2 puffs, Inhalation, Daily    tamsulosin (FLOMAX) 0.4 mg Cap 1 capsule, Daily    triamcinolone (NASACORT) 55 mcg nasal inhaler 2 sprays, Daily    zinc gluconate 50 mg, Daily       Review of patient's allergies indicates:   Allergen Reactions    Ciprofloxacin Other (See Comments)        Immunization History   Administered Date(s) Administered    COVID-19 Vaccine 03/02/2021, 03/30/2021, 12/07/2021    COVID-19, MRNA, LN-S, PF (MODERNA FULL 0.5 ML DOSE) 03/02/2021, 03/30/2021, 12/07/2021        Patient Care Team:  Amberly Santa DO as PCP - General (Family Medicine)  Brooklyn Weinstein MD as Consulting Physician (Nephrology)  Kenia Madison ACNP as Nurse Practitioner (Nephrology)  Franky Guardado FNP as Nurse Practitioner (Nephrology)  Trista Paige DO as Consulting Physician (Nephrology)    Subjective:     Review of Systems   Constitutional:  Negative for activity change, appetite change, chills, diaphoresis, fatigue, fever and unexpected weight change.   HENT:  Positive for hearing loss.   "  Eyes:  Negative for visual disturbance.   Respiratory:  Positive for cough and shortness of breath. Negative for apnea.    Cardiovascular:  Negative for chest pain, palpitations and leg swelling.   Gastrointestinal:  Negative for abdominal pain, blood in stool, constipation, diarrhea, nausea and vomiting.   Genitourinary:  Negative for dysuria and hematuria.   Musculoskeletal:  Negative for arthralgias.   Skin:  Negative for rash and wound.   Allergic/Immunologic: Positive for environmental allergies.   Neurological:  Negative for dizziness, syncope, weakness, numbness and headaches.   Psychiatric/Behavioral:  Negative for behavioral problems, dysphoric mood and sleep disturbance. The patient is not nervous/anxious.      Objective:     Visit Vitals  /74   Pulse 63   Temp 97.3 °F (36.3 °C) (Oral)   Resp 18   Ht 5' 8" (1.727 m)   Wt 74.2 kg (163 lb 9.6 oz)   SpO2 96%   BMI 24.88 kg/m²       Physical Exam  Vitals and nursing note reviewed.   Constitutional:       General: He is not in acute distress.     Appearance: Normal appearance. He is not ill-appearing or toxic-appearing.   HENT:      Head: Normocephalic and atraumatic.      Mouth/Throat:      Mouth: Mucous membranes are moist.      Pharynx: Oropharynx is clear.   Eyes:      Conjunctiva/sclera: Conjunctivae normal.   Cardiovascular:      Rate and Rhythm: Normal rate and regular rhythm.      Heart sounds: Normal heart sounds. No murmur heard.  Pulmonary:      Effort: Pulmonary effort is normal. No respiratory distress.      Breath sounds: Normal breath sounds. No wheezing.   Abdominal:      General: Bowel sounds are normal. There is no distension.      Palpations: Abdomen is soft.      Tenderness: There is no abdominal tenderness.   Musculoskeletal:         General: No deformity. Normal range of motion.      Cervical back: Neck supple. No tenderness.      Right lower leg: No edema.      Left lower leg: No edema.   Lymphadenopathy:      Cervical: No " cervical adenopathy.   Skin:     General: Skin is warm and dry.      Findings: No rash.      Comments: +multiple atypical facial moles   Neurological:      General: No focal deficit present.      Mental Status: He is alert. Mental status is at baseline.      Motor: No weakness.   Psychiatric:         Mood and Affect: Mood normal.         Behavior: Behavior normal.         Thought Content: Thought content normal.         Judgment: Judgment normal.         Assessment:       ICD-10-CM ICD-9-CM   1. Medicare annual wellness visit, subsequent  Z00.00 V70.0   2. Chronic obstructive pulmonary disease, unspecified COPD type  J44.9 496   3. Pulmonary nodule, right  R91.1 793.11   4. Personal history of nicotine dependence  Z87.891 V15.82   5. Screening for lung cancer  Z12.2 V76.0   6. Coronary artery disease, unspecified vessel or lesion type, unspecified whether angina present, unspecified whether native or transplanted heart  I25.10 414.00   7. Benign prostatic hyperplasia, unspecified whether lower urinary tract symptoms present  N40.0 600.00   8. History of bladder cancer  Z85.51 V10.51   9. Stage 3a chronic kidney disease  N18.31 585.3   10. Gastroesophageal reflux disease, unspecified whether esophagitis present  K21.9 530.81   11. Prediabetes  R73.03 790.29   12. Atypical nevi  D22.9 216.9        Plan:     1. Medicare annual wellness visit, subsequent  Comments:  - Health maintenance reviewed.  Orders:  -     CT Chest Lung Screening Low Dose; Future; Expected date: 07/10/2025    2. Chronic obstructive pulmonary disease, unspecified COPD type  Assessment & Plan:  - Stable.  - Patient following with Pulmonology.  - He had COVID in 11/2020 and stopped smoking since that time.  - He states compliance with his Stiolto Respimat daily.      3. Pulmonary nodule, right  Assessment & Plan:  - Stable.      4. Personal history of nicotine dependence  -     CT Chest Lung Screening Low Dose; Future; Expected date: 07/10/2025    5.  Screening for lung cancer  -     CT Chest Lung Screening Low Dose; Future; Expected date: 07/10/2025    6. Coronary artery disease, unspecified vessel or lesion type, unspecified whether angina present, unspecified whether native or transplanted heart  Assessment & Plan:  - Stable.  - Patient following with Dr. Nas Simon with Cardiology.  - Patient taking Aspirin and Lipitor.  - Patient not able to take beta blocker due to low HR.      7. Benign prostatic hyperplasia, unspecified whether lower urinary tract symptoms present  Assessment & Plan:  - Stable.  - Patient following with Dr. Demarco Munoz with Urology.  - He is taking Finasteride and Flomax.      8. History of bladder cancer  Assessment & Plan:  - Patient had metastases to the kidney and treated with surgery and chemo in 2012.       9. Stage 3a chronic kidney disease  Assessment & Plan:  - Stable.  - Patient following with Dr. rBooklyn Muse with Nephrology.       10. Gastroesophageal reflux disease, unspecified whether esophagitis present  Assessment & Plan:  - Stable.  - Continue Pepcid PRN.      11. Prediabetes  Assessment & Plan:  - A1c for routine monitoring.    Orders:  -     Hemoglobin A1C; Future; Expected date: 07/10/2025    12. Atypical nevi  -     Ambulatory referral/consult to Dermatology; Future; Expected date: 07/17/2025         A comprehensive HEALTH RISK ASSESSMENT was completed today. Results are summarized below:                  The patient is NOT A TOBACCO USER.  The patient reports NO SIGNIFICANT ALCOHOL USE.     All Questions regarding food, transportation or housing were not answered today.    The patient was asked and declined the use of a free .    Advance Care Planning     Date: 07/10/2025    ACP Reviewed/No Changes  Voluntary advance care planning discussion had today with patient. Education/information provided with patient; he will review this at his convenience.      A total of 1 min was spent on advance care  planning, goals of care discussion, emotional support, formulating and communicating prognosis and exploring burden/benefit of various approaches of treatment. This discussion occurred on a fully voluntary basis with the verbal consent of the patient and/or family.       Recommendations were developed using the USPSTF age appropriate recommendations. Education, counseling, and referrals were provided as needed.     Follow up in about 6 months (around 1/10/2026) for Chronic Medical Management. In addition to their scheduled follow up, the patient has also been instructed to follow up on as needed basis.     Future Appointments   Date Time Provider Department Center   10/28/2025  2:20 PM Kenia Madison, [facility] [place]   1/12/2026  1:00 PM Amberly Santa DO [facility] [place]   2/2/2026  2:20 PM Jean Carlos Santillan MD [facility] SANTA DO